# Patient Record
Sex: FEMALE | Race: WHITE | NOT HISPANIC OR LATINO | Employment: FULL TIME | URBAN - METROPOLITAN AREA
[De-identification: names, ages, dates, MRNs, and addresses within clinical notes are randomized per-mention and may not be internally consistent; named-entity substitution may affect disease eponyms.]

---

## 2023-05-01 ENCOUNTER — DOCUMENTATION (OUTPATIENT)
Dept: CARE COORDINATION | Facility: CLINIC | Age: 28
End: 2023-05-01

## 2023-08-08 LAB
CLUE CELLS: NORMAL
NUGENT SCORE: 2
YEAST: NORMAL

## 2023-10-02 ENCOUNTER — OFFICE VISIT (OUTPATIENT)
Dept: OBSTETRICS AND GYNECOLOGY | Facility: CLINIC | Age: 28
End: 2023-10-02

## 2023-10-02 VITALS
WEIGHT: 151 LBS | HEIGHT: 62 IN | DIASTOLIC BLOOD PRESSURE: 74 MMHG | BODY MASS INDEX: 27.79 KG/M2 | SYSTOLIC BLOOD PRESSURE: 112 MMHG

## 2023-10-02 DIAGNOSIS — Z30.431 ENCOUNTER FOR ROUTINE CHECKING OF INTRAUTERINE CONTRACEPTIVE DEVICE (IUD): ICD-10-CM

## 2023-10-02 DIAGNOSIS — N30.90 CYSTITIS: Primary | ICD-10-CM

## 2023-10-02 PROCEDURE — 99212 OFFICE O/P EST SF 10 MIN: CPT | Performed by: OBSTETRICS & GYNECOLOGY

## 2023-10-02 PROCEDURE — 1036F TOBACCO NON-USER: CPT | Performed by: OBSTETRICS & GYNECOLOGY

## 2023-10-02 RX ORDER — IBUPROFEN 600 MG/1
600 TABLET ORAL EVERY 8 HOURS PRN
COMMUNITY
Start: 2023-01-09 | End: 2024-04-19 | Stop reason: ALTCHOICE

## 2023-10-02 RX ORDER — CLOTRIMAZOLE AND BETAMETHASONE DIPROPIONATE 10; .64 MG/G; MG/G
1 CREAM TOPICAL DAILY
COMMUNITY
Start: 2023-09-08 | End: 2023-12-08

## 2023-10-02 RX ORDER — LEVONORGESTREL 19.5 MG/1
INTRAUTERINE DEVICE INTRAUTERINE
COMMUNITY
End: 2024-04-22 | Stop reason: WASHOUT

## 2023-10-02 RX ORDER — LIDOCAINE 50 MG/G
1 PATCH TOPICAL AS NEEDED
COMMUNITY
Start: 2023-04-26 | End: 2024-04-19 | Stop reason: ALTCHOICE

## 2023-10-02 RX ORDER — NITROFURANTOIN 25; 75 MG/1; MG/1
100 CAPSULE ORAL 2 TIMES DAILY
Qty: 14 CAPSULE | Refills: 0 | Status: SHIPPED | OUTPATIENT
Start: 2023-10-02 | End: 2023-10-09

## 2023-10-02 NOTE — PROGRESS NOTES
Subjective   Patient ID: Kiera Vuong is a 27 y.o. female who presents for Urinary Symptom (Possible UTI, c/o frequency, itching, burning, bladder and back pain.).  HPI  Patient with 1 week history of frequency and burning, also with some itching.  Patient with mild back pain, denies fever or chills.  Patient states this feels different than her IC.  No improvement with cranberry pills or probiotics.  Patient is tolerating her IUD well, with monthly menses.        Objective   Physical Exam  Constitutional:       Appearance: Normal appearance. She is well-developed.   Abdominal:      Palpations: Abdomen is soft. There is no mass.      Tenderness: There is no abdominal tenderness. There is no right CVA tenderness or left CVA tenderness.   Neurological:      Mental Status: She is alert.   Psychiatric:         Mood and Affect: Mood normal.         Behavior: Behavior normal.         Assessment/Plan   Problem List Items Addressed This Visit             ICD-10-CM    Encounter for routine checking of intrauterine contraceptive device (IUD) Z30.431     Tolerating Kyleena well.  Patient with monthly menses.         Cystitis - Primary N30.90     Patient with frequency, burning, and itching for 1 week.  Patient with some back pain, denies fever or chills.  Patient with history of IC, and this does feel different.  Patient's been using cranberry pills and probiotics with no improvement.  UA with moderate leukocytes  ASSESSMENT: Probable cystitis  PLAN: We will send urine culture, and treat with Macrobid twice daily for 7 days

## 2023-10-02 NOTE — ASSESSMENT & PLAN NOTE
Patient with frequency, burning, and itching for 1 week.  Patient with some back pain, denies fever or chills.  Patient with history of IC, and this does feel different.  Patient's been using cranberry pills and probiotics with no improvement.  UA with moderate leukocytes  ASSESSMENT: Probable cystitis  PLAN: We will send urine culture, and treat with Macrobid twice daily for 7 days

## 2023-10-04 LAB — BACTERIA UR CULT: NORMAL

## 2023-12-08 DIAGNOSIS — N89.8 VAGINAL DISCHARGE: Primary | ICD-10-CM

## 2023-12-08 RX ORDER — CLOTRIMAZOLE AND BETAMETHASONE DIPROPIONATE 10; .64 MG/G; MG/G
CREAM TOPICAL
Qty: 45 G | Refills: 1 | Status: SHIPPED | OUTPATIENT
Start: 2023-12-08

## 2024-03-22 ENCOUNTER — OFFICE VISIT (OUTPATIENT)
Dept: OBSTETRICS AND GYNECOLOGY | Facility: CLINIC | Age: 29
End: 2024-03-22

## 2024-03-22 VITALS — BODY MASS INDEX: 30.18 KG/M2 | WEIGHT: 165 LBS | DIASTOLIC BLOOD PRESSURE: 80 MMHG | SYSTOLIC BLOOD PRESSURE: 120 MMHG

## 2024-03-22 DIAGNOSIS — T83.32XA IUD STRINGS LOST: Primary | ICD-10-CM

## 2024-03-22 LAB — PREGNANCY TEST URINE, POC: NEGATIVE

## 2024-03-22 PROCEDURE — 99212 OFFICE O/P EST SF 10 MIN: CPT | Performed by: NURSE PRACTITIONER

## 2024-03-22 NOTE — PROGRESS NOTES
Subjective   Patient ID: Kiera Vuong is a 28 y.o. female who presents for Pelvic Pain.  KINGSLEY Qureshi presents today with a couple of concerns.   She is having pelvic pain and bloating.   She has an IUD for pregnancy prevention. Periods are infrequent.   She also c/o mood swings, changes in her skin (acne) and vaginal discharge.   Denies any change in partners. She declines STD testing today.   She denies any fever, chills, od dysuria.     Review of Systems   Genitourinary:  Positive for pelvic pain and vaginal discharge. Negative for urgency.   All other systems reviewed and are negative.      Objective   Physical Exam  Constitutional:       Appearance: Normal appearance.   Pulmonary:      Effort: Pulmonary effort is normal.   Genitourinary:     General: Normal vulva.      Vagina: No vaginal discharge.      Cervix: Normal.      Uterus: Normal.       Adnexa: Right adnexa normal and left adnexa normal.      Comments: No IUD strings noted on exam.   Psychiatric:         Mood and Affect: Mood normal.         Behavior: Behavior normal.         Assessment/Plan   Diagnoses and all orders for this visit:  Here with c/o pelvic pain, bloating. On exam, her IUD strings are lost. No gram stain obtained as there was no discharge noted on exam. No cervical motion tenderness and no fundal tenderness.   IUD strings lost  -     US PELVIS TRANSABDOMINAL WITH TRANSVAGINAL; Future  -     POCT pregnancy, urine manually resulted  Follow-up pending results of pelvic ultrasound.        NATHANAEL Brown-CNP 03/22/24 2:52 PM

## 2024-03-24 ENCOUNTER — PATIENT MESSAGE (OUTPATIENT)
Dept: OBSTETRICS AND GYNECOLOGY | Facility: CLINIC | Age: 29
End: 2024-03-24

## 2024-03-24 DIAGNOSIS — R35.0 URINARY FREQUENCY: Primary | ICD-10-CM

## 2024-03-25 RX ORDER — NITROFURANTOIN 25; 75 MG/1; MG/1
100 CAPSULE ORAL 2 TIMES DAILY
Qty: 10 CAPSULE | Refills: 0 | Status: SHIPPED | OUTPATIENT
Start: 2024-03-25 | End: 2024-03-30

## 2024-03-25 NOTE — PATIENT COMMUNICATION
I spoke with the patient by phone. She has not had any fevers today. She is however having urinary frequency and lower abdominal cramping. Will Rx Macrobid her her. She ha an upcoming pelvic ultrasound scheduled for the 2nd of April.

## 2024-03-28 ENCOUNTER — TELEPHONE (OUTPATIENT)
Dept: OBSTETRICS AND GYNECOLOGY | Facility: CLINIC | Age: 29
End: 2024-03-28

## 2024-03-29 NOTE — TELEPHONE ENCOUNTER
Pt would like to speak with you. She has the disc that her images of her pelvic US  are on. She wants to know if she should drop off the disc for Marland to review.

## 2024-03-29 NOTE — TELEPHONE ENCOUNTER
Spoke to patient- told we are unable to view disc in office. Per Fadia advised to still have pelvic ultrasound done due to symptoms.  ..verbalized understanding

## 2024-03-31 ENCOUNTER — APPOINTMENT (OUTPATIENT)
Dept: CARDIOLOGY | Facility: HOSPITAL | Age: 29
End: 2024-03-31

## 2024-03-31 ENCOUNTER — HOSPITAL ENCOUNTER (EMERGENCY)
Facility: HOSPITAL | Age: 29
Discharge: HOME | End: 2024-03-31
Attending: EMERGENCY MEDICINE

## 2024-03-31 ENCOUNTER — APPOINTMENT (OUTPATIENT)
Dept: RADIOLOGY | Facility: HOSPITAL | Age: 29
End: 2024-03-31

## 2024-03-31 VITALS
HEIGHT: 62 IN | OXYGEN SATURATION: 98 % | RESPIRATION RATE: 18 BRPM | WEIGHT: 160 LBS | BODY MASS INDEX: 29.44 KG/M2 | TEMPERATURE: 98.2 F | HEART RATE: 89 BPM

## 2024-03-31 DIAGNOSIS — R00.2 PALPITATIONS: Primary | ICD-10-CM

## 2024-03-31 LAB
ALBUMIN SERPL BCP-MCNC: 4.4 G/DL (ref 3.4–5)
ALP SERPL-CCNC: 49 U/L (ref 33–110)
ALT SERPL W P-5'-P-CCNC: 8 U/L (ref 7–45)
ANION GAP SERPL CALC-SCNC: 11 MMOL/L (ref 10–20)
AST SERPL W P-5'-P-CCNC: 15 U/L (ref 9–39)
BASOPHILS # BLD AUTO: 0.11 X10*3/UL (ref 0–0.1)
BASOPHILS NFR BLD AUTO: 1.1 %
BILIRUB SERPL-MCNC: 0.4 MG/DL (ref 0–1.2)
BUN SERPL-MCNC: 11 MG/DL (ref 6–23)
CALCIUM SERPL-MCNC: 9.2 MG/DL (ref 8.6–10.3)
CHLORIDE SERPL-SCNC: 104 MMOL/L (ref 98–107)
CO2 SERPL-SCNC: 25 MMOL/L (ref 21–32)
CREAT SERPL-MCNC: 0.74 MG/DL (ref 0.5–1.05)
EGFRCR SERPLBLD CKD-EPI 2021: >90 ML/MIN/1.73M*2
EOSINOPHIL # BLD AUTO: 0.16 X10*3/UL (ref 0–0.7)
EOSINOPHIL NFR BLD AUTO: 1.6 %
ERYTHROCYTE [DISTWIDTH] IN BLOOD BY AUTOMATED COUNT: 11.5 % (ref 11.5–14.5)
GLUCOSE SERPL-MCNC: 82 MG/DL (ref 74–99)
HCG UR QL IA.RAPID: NEGATIVE
HCT VFR BLD AUTO: 42.3 % (ref 36–46)
HGB BLD-MCNC: 15.4 G/DL (ref 12–16)
IMM GRANULOCYTES # BLD AUTO: 0.02 X10*3/UL (ref 0–0.7)
IMM GRANULOCYTES NFR BLD AUTO: 0.2 % (ref 0–0.9)
LYMPHOCYTES # BLD AUTO: 2.82 X10*3/UL (ref 1.2–4.8)
LYMPHOCYTES NFR BLD AUTO: 27.8 %
MAGNESIUM SERPL-MCNC: 1.91 MG/DL (ref 1.6–2.4)
MCH RBC QN AUTO: 32 PG (ref 26–34)
MCHC RBC AUTO-ENTMCNC: 36.4 G/DL (ref 32–36)
MCV RBC AUTO: 88 FL (ref 80–100)
MONOCYTES # BLD AUTO: 0.62 X10*3/UL (ref 0.1–1)
MONOCYTES NFR BLD AUTO: 6.1 %
NEUTROPHILS # BLD AUTO: 6.43 X10*3/UL (ref 1.2–7.7)
NEUTROPHILS NFR BLD AUTO: 63.2 %
NRBC BLD-RTO: 0 /100 WBCS (ref 0–0)
PLATELET # BLD AUTO: 306 X10*3/UL (ref 150–450)
POTASSIUM SERPL-SCNC: 4.2 MMOL/L (ref 3.5–5.3)
PROT SERPL-MCNC: 7.6 G/DL (ref 6.4–8.2)
RBC # BLD AUTO: 4.81 X10*6/UL (ref 4–5.2)
SODIUM SERPL-SCNC: 136 MMOL/L (ref 136–145)
TSH SERPL-ACNC: 1.58 MIU/L (ref 0.44–3.98)
WBC # BLD AUTO: 10.2 X10*3/UL (ref 4.4–11.3)

## 2024-03-31 PROCEDURE — 71045 X-RAY EXAM CHEST 1 VIEW: CPT

## 2024-03-31 PROCEDURE — 99283 EMERGENCY DEPT VISIT LOW MDM: CPT | Mod: 25

## 2024-03-31 PROCEDURE — 83735 ASSAY OF MAGNESIUM: CPT | Performed by: EMERGENCY MEDICINE

## 2024-03-31 PROCEDURE — 81025 URINE PREGNANCY TEST: CPT | Performed by: EMERGENCY MEDICINE

## 2024-03-31 PROCEDURE — 2500000004 HC RX 250 GENERAL PHARMACY W/ HCPCS (ALT 636 FOR OP/ED): Performed by: EMERGENCY MEDICINE

## 2024-03-31 PROCEDURE — 96360 HYDRATION IV INFUSION INIT: CPT

## 2024-03-31 PROCEDURE — 85025 COMPLETE CBC W/AUTO DIFF WBC: CPT | Performed by: EMERGENCY MEDICINE

## 2024-03-31 PROCEDURE — 93005 ELECTROCARDIOGRAM TRACING: CPT

## 2024-03-31 PROCEDURE — 36415 COLL VENOUS BLD VENIPUNCTURE: CPT | Performed by: EMERGENCY MEDICINE

## 2024-03-31 PROCEDURE — 80053 COMPREHEN METABOLIC PANEL: CPT | Performed by: EMERGENCY MEDICINE

## 2024-03-31 PROCEDURE — 84443 ASSAY THYROID STIM HORMONE: CPT | Performed by: EMERGENCY MEDICINE

## 2024-03-31 PROCEDURE — 71045 X-RAY EXAM CHEST 1 VIEW: CPT | Mod: FOREIGN READ | Performed by: RADIOLOGY

## 2024-03-31 RX ADMIN — SODIUM CHLORIDE, POTASSIUM CHLORIDE, SODIUM LACTATE AND CALCIUM CHLORIDE 1000 ML: 600; 310; 30; 20 INJECTION, SOLUTION INTRAVENOUS at 16:00

## 2024-03-31 ASSESSMENT — PAIN SCALES - GENERAL
PAINLEVEL_OUTOF10: 3
PAINLEVEL_OUTOF10: 1

## 2024-03-31 ASSESSMENT — PAIN - FUNCTIONAL ASSESSMENT
PAIN_FUNCTIONAL_ASSESSMENT: 0-10
PAIN_FUNCTIONAL_ASSESSMENT: 0-10

## 2024-03-31 ASSESSMENT — PAIN DESCRIPTION - LOCATION: LOCATION: ABDOMEN

## 2024-03-31 ASSESSMENT — PAIN DESCRIPTION - DESCRIPTORS: DESCRIPTORS: ACHING

## 2024-03-31 NOTE — ED PROVIDER NOTES
"HPI   Chief Complaint   Patient presents with    abdominal pain/ sob       The patient is a 28 year old female presenting with palpitations. Notes that over the last several days she has been experiencing intermittent episodes where she feels like her heart is beating \"harder\" than normal. Denies any sensation of a rapid or slow heartbeat. Denies any dizziness, lightheadedness. Denies any associated nausea, vomiting, diarrhea, abdominal pain. Denies any associated chest pain. Denies any recent medication changes. Denies any lower extremity edema or pain, denies any history of DVT/ PE, denies any oral contraceptive use, denies any prolonged periods of immobility. Denies any personal or family history of early onset cardiac disease.                           Sterling Coma Scale Score: 15                     Patient History   Past Medical History:   Diagnosis Date    Interstitial cystitis (chronic) without hematuria     Interstitial cystitis    Personal history of other diseases of the respiratory system     History of asthma    Personal history of other mental and behavioral disorders     History of anxiety disorder    Personal history of other specified conditions 01/29/2019    History of dysuria    Personal history of urinary (tract) infections 01/29/2019    History of urinary tract infection     Past Surgical History:   Procedure Laterality Date    CYSTOSCOPY W/ URETERAL STENT REMOVAL      KIDNEY STONE SURGERY      OTHER SURGICAL HISTORY  06/02/2022    Bladder surgery    RHINOPLASTY  10/04/2018    Rhinoplasty    SEPTOPLASTY  10/04/2018    Septoplasty     Family History   Problem Relation Name Age of Onset    No Known Problems Mother      Kidney nephrosis Father       Social History     Tobacco Use    Smoking status: Never    Smokeless tobacco: Never   Vaping Use    Vaping Use: Former    Quit date: 3/22/2023    Substances: THC    Devices: LUVHAN   Substance Use Topics    Alcohol use: Yes     Alcohol/week: " 4.0 standard drinks of alcohol     Types: 4 Glasses of wine per week    Drug use: Yes     Types: Marijuana       Physical Exam   ED Triage Vitals [03/31/24 1254]   Temperature Heart Rate Respirations BP   36.8 °C (98.2 °F) 89 18 --      Pulse Ox Temp Source Heart Rate Source Patient Position   98 % Temporal Monitor Sitting      BP Location FiO2 (%)     Left arm --       Physical Exam  Vitals and nursing note reviewed.   Constitutional:       General: She is not in acute distress.     Appearance: She is well-developed.   HENT:      Head: Normocephalic and atraumatic.   Eyes:      Conjunctiva/sclera: Conjunctivae normal.   Cardiovascular:      Rate and Rhythm: Normal rate and regular rhythm.      Pulses:           Radial pulses are 2+ on the right side and 2+ on the left side.        Dorsalis pedis pulses are 2+ on the right side and 2+ on the left side.      Heart sounds: Normal heart sounds, S1 normal and S2 normal. No murmur heard.  Pulmonary:      Effort: Pulmonary effort is normal. No respiratory distress.      Breath sounds: Normal breath sounds. No decreased breath sounds, wheezing, rhonchi or rales.   Abdominal:      General: Abdomen is flat.      Palpations: Abdomen is soft.      Tenderness: There is no abdominal tenderness.   Musculoskeletal:         General: No swelling.      Cervical back: Neck supple.      Right lower leg: No edema.      Left lower leg: No edema.   Skin:     General: Skin is warm and dry.      Capillary Refill: Capillary refill takes less than 2 seconds.   Neurological:      General: No focal deficit present.      Mental Status: She is alert.      Cranial Nerves: Cranial nerves 2-12 are intact.      Sensory: Sensation is intact.      Motor: Motor function is intact.   Psychiatric:         Mood and Affect: Mood normal.         ED Course & MDM       Medical Decision Making  Patient presenting with palpitations. On exam patient very well appearing, demonstrating normal range vital signs,  reassuring cardiopulmonary/ vascular/ neurological examination. Will assess for dysrhythmia, precipitating metabolic abnormality, anemia,  pericarditis/ myocarditis, pneumonia, pneumothorax with troponins, ECG, CXR, labs, urine pregnancy testing. Will administer IV fluids with consideration for orthostasis/ dehydration. Disposition pending labs, imaging, reassessment      ED Course as of 04/03/24 1732   Sun Mar 31, 2024   1725 ECG: Sinus rhythm, rate 66, QRS 94, QTc 391.  No ST changes to suggest ischemia, no evidence of preexcitation syndrome. [BR]   1855 Patient's TSH within normal limits, CMP unremarkable, magnesium within normal limits, hCG negative.  Chest x-ray unremarkable.  Patient continues to demonstrate reassuring vital signs in the emergency department, she is appropriate for discharge.  Will provide cardiology follow-up for recurrent palpitations.  Discussed return precautions and follow-up with the patient and family, they convey understanding to these instructions.  Patient discharged in good condition [BR]      ED Course User Index  [BR] Partha Casiano MD         Diagnoses as of 04/03/24 1732   Palpitations         Procedure  Procedures     Partha Casiano MD  04/03/24 1732

## 2024-04-02 ENCOUNTER — HOSPITAL ENCOUNTER (OUTPATIENT)
Dept: RADIOLOGY | Facility: HOSPITAL | Age: 29
Discharge: HOME | End: 2024-04-02

## 2024-04-02 DIAGNOSIS — T83.32XA IUD STRINGS LOST: ICD-10-CM

## 2024-04-02 PROCEDURE — 76856 US EXAM PELVIC COMPLETE: CPT

## 2024-04-02 PROCEDURE — 76830 TRANSVAGINAL US NON-OB: CPT | Performed by: RADIOLOGY

## 2024-04-02 PROCEDURE — 76856 US EXAM PELVIC COMPLETE: CPT | Performed by: RADIOLOGY

## 2024-04-03 ENCOUNTER — APPOINTMENT (OUTPATIENT)
Dept: CARDIOLOGY | Facility: CLINIC | Age: 29
End: 2024-04-03

## 2024-04-03 PROBLEM — R00.2 PALPITATIONS: Status: ACTIVE | Noted: 2024-04-03

## 2024-04-03 PROBLEM — R06.02 SHORTNESS OF BREATH: Status: ACTIVE | Noted: 2024-04-03

## 2024-04-03 NOTE — PROGRESS NOTES
[facility] Cardiology    Patient Name: Kiera Vuong  Patient : 1995    Scribe Attestation  By signing my name below, ICarrie Scribe   attest that this documentation has been prepared under the direction and in the presence of Partha Meneses DO.        Reason for visit:  This is a 28-year-old female here for evaluation regarding palpitations and shortness of breath.     HPI:  This is a 28-year-old female here for evaluation regarding palpitations and shortness of breath.  The patient has never been evaluated by [facility] in the past.  Patient was seen in the emergency department on 3/31/2024 for palpitations, abdominal pain, and shortness of breath.  CMP done 3/31/2024 showed normal serum sodium and potassium with a serum creatinine 0.74, normal ALT/AST, serum magnesium was 1.91, TSH was 1.58, hemoglobin was 15.4.  Chest x-ray done 3/31/2024 showed no radiographic evidence of acute cardiopulmonary disease.  ECG done 3/31/2024 showed sinus rhythm, PAC, heart rate 66 bpm.  The patient was subsequently discharged home from the emergency department and referred to cardiology outpatient. ECG done today showed sinus rhythm with a heart rate of 87 bpm.  The patient reports random intermittent palpitations that occurs a few times per month. She also she noticed that palpitations sometimes are related to pain. She also reports shortness of breath on exertion after climbing a flight of stairs and lightheadedness when quickly changing positions and when taking hot showers. She denies associated chest pain on exertion. During my exam, she was resting comfortably on the exam table.          Assessment/Plan:   Palpitations  The patient reports palpitations as described in the HPI. She was seen in the emergency department on 3/31/2024 for palpitations, abdominal pain, and shortness of breath.    ECG done today showed sinus rhythm with a heart rate of 87 bpm.     Blood pressure appears controlled on exam today.  Recent lab works as noted in the HPI.  Echocardiogram and Holter monitor were ordered as noted below.  Patient should follow general recommendations including avoiding excessive alcohol intake, avoiding excessive caffeine intake, staying well-hydrated, getting an appropriate amount of sleep.  Follow up in 1 month and sooner if necessary.     2. Shortness of breath  The patient was seen at the ED on 3/31/24 for shortness of breath and palpitations.  She does not appear significantly volume overloaded on exam today.  Recent lab works as noted in the HPI.  Echocardiogram and TMET ordered as noted below.  I discussed with her the importance of following a low-sodium heart healthy diet as well as weight loss.   Follow up in 1 month and sooner if necessary.     3. Lightheadedness/dizziness  The patient reports occasional lightheadedness when quickly changing positions.  ECG done today showed sinus rhythm with a heart rate of 87 bpm.    Blood pressure appears controlled on exam today.  Recent lab works as noted in the HPI.  Patient should follow general recommendations including staying well-hydrated, changing the positions slowly, wearing compression stockings as necessary, and routine exercise.     Orders:   Holter monitor,   Echocardiogram,   TMET  Follow-up in 1 month.      Lifestyle Recommendations  I recommend a whole-food plant-based diet, an eating pattern that encourages the consumption of unrefined plant foods (such as fruits, vegetables, tubers, whole grains, legumes, nuts and seeds) and discourages meats, dairy products, eggs and processed foods.     The AHA/ACC recommends that the patient consume a dietary pattern that emphasizes intake of vegetables, fruits, and whole grains; includes low-fat dairy products, poultry, fish, legumes, non-tropical vegetable oils, and nuts; and limits intake of sodium, sweets, sugar-sweetened beverages, and red meats.  Adapt  this dietary pattern to appropriate calorie requirements (a 500-750 kcal/day deficit to loose weight), personal and cultural food preferences, and nutrition therapy for other medical conditions (including diabetes).  Achieve this pattern by following plans such as the [place] Mediterranean, DASH dietary pattern, or AHA diet.     Engage in 2 hours and 30 minutes per week of moderate-intensity physical activity, or 1 hour and 15 minutes (75 minutes) per week of vigorous-intensity aerobic physical activity, or an equivalent combination of moderate and vigorous-intensity aerobic physical activity. Aerobic activity should be performed in episodes of at least 10 minutes preferably spread throughout the week.     Adhering to a heart healthy diet, regular exercise habits, avoidance of tobacco products, and maintenance of a healthy weight are crucial components of their heart disease risk reduction.     Any positive review of systems not specifically addressed in the office visit today should be evaluated and treated by the patients primary care physician or in an emergency department if necessary     Patient was notified that results from ordered tests will be called to the patient if it changes current management; it will otherwise be discussed at a future appointment and available on [facility].     Thank you for allowing me to participate in the care of this patient.        This document was generated using the assistance of voice recognition software. If there are any errors of spelling, grammar, syntax, or meaning; please feel free to contact me directly for clarification.      Past Medical History:  She has a past medical history of Interstitial cystitis (chronic) without hematuria, Personal history of other diseases of the respiratory system, Personal history of other mental and behavioral disorders, Personal history of other specified conditions (01/29/2019), and Personal history of urinary (tract) infections  "(01/29/2019).    Past Surgical History:  She has a past surgical history that includes Septoplasty (10/04/2018); Rhinoplasty (10/04/2018); Other surgical history (06/02/2022); Kidney stone surgery; and Cystoscopy w/ ureteral stent removal.      Social History:  She reports that she has never smoked. She has never used smokeless tobacco. She reports current alcohol use of about 4.0 standard drinks of alcohol per week. She reports current drug use. Drug: Marijuana.    Family History:  Family History   Problem Relation Name Age of Onset    No Known Problems Mother      Kidney nephrosis Father          Allergies:  Bactrim [sulfamethoxazole-trimethoprim] and Fluconazole    Outpatient Medications:  Current Outpatient Medications   Medication Instructions    clotrimazole-betamethasone (Lotrisone) cream APPLY SPARINGLY TO AFFECTED AREA 3 TIMES A DAY    ibuprofen 600 mg, oral, Every 8 hours PRN    levonorgestreL (Kyleena) 17.5 mcg/24 hrs (5 yrs) 19.5 mg intrauterine device intrauterine, Inserted on 09/04/2020    lidocaine (Lidoderm) 5 % patch 1 patch, transdermal, As needed        ROS:  A 14 point review of systems was done and is negative other than as stated in HPI    Vitals:      1/10/2023     9:45 AM 1/10/2023     9:46 AM 4/26/2023     5:38 PM 8/7/2023     9:09 AM 10/2/2023     1:04 PM 3/22/2024     1:19 PM 3/31/2024    12:54 PM   Vitals   Systolic  132 125 102 112 120    Diastolic  82 90 70 74 80    Heart Rate  103 84    89   Temp   36.6 °C (97.8 °F)    36.8 °C (98.2 °F)   Resp   16    18   Height (in) 1.575 m (5' 2\")   1.575 m (5' 2\") 1.575 m (5' 2\")  1.575 m (5' 2\")   Weight (lb) 143   153.66 151 165 160   BMI 26.16 kg/m2   28.1 kg/m2 27.62 kg/m2 30.18 kg/m2 29.26 kg/m2   BSA (m2) 1.68 m2   1.75 m2 1.73 m2 1.81 m2 1.78 m2        Physical Exam:   Constitutional: Cooperative, in no acute distress, alert, appears stated age.  Skin: Skin color, texture, turgor normal. No rashes or lesions.  Head: Normocephalic. No masses, " lesions, tenderness or abnormalities  Eyes: Extraocular movements are grossly intact.  Mouth and throat: Mucous membranes moist  Neck: Neck supple, no carotid bruits, no JVD  Respiratory: Lungs clear to auscultation, no wheezing or rhonchi, no use of accessory muscles  Chest wall: No scars, normal excursion with respiration  Cardiovascular: Regular rhythm without murmur  Gastrointestinal: Abdomen soft, nontender. Bowel sounds normal.  Musculoskeletal: Strength equal in upper extremities  Extremities: No pitting edema  Neurologic: Sensation grossly intact, alert and oriented ×3    Intake/Output:   No intake/output data recorded.    Outpatient Medications  Current Outpatient Medications on File Prior to Visit   Medication Sig Dispense Refill    clotrimazole-betamethasone (Lotrisone) cream APPLY SPARINGLY TO AFFECTED AREA 3 TIMES A DAY 45 g 1    ibuprofen 600 mg tablet Take 1 tablet (600 mg) by mouth every 8 hours if needed.      levonorgestreL (Kyleena) 17.5 mcg/24 hrs (5 yrs) 19.5 mg intrauterine device by intrauterine route. Inserted on 2020      lidocaine (Lidoderm) 5 % patch Place 1 patch on the skin if needed.      [] nitrofurantoin, macrocrystal-monohydrate, (Macrobid) 100 mg capsule Take 1 capsule (100 mg) by mouth 2 times a day for 5 days. 10 capsule 0     No current facility-administered medications on file prior to visit.       Labs: (past 26 weeks)  Recent Results (from the past 4368 hour(s))   POCT pregnancy, urine manually resulted    Collection Time: 24  1:57 PM   Result Value Ref Range    Preg Test, Ur Negative Negative   CBC and Auto Differential    Collection Time: 24  3:56 PM   Result Value Ref Range    WBC 10.2 4.4 - 11.3 x10*3/uL    nRBC 0.0 0.0 - 0.0 /100 WBCs    RBC 4.81 4.00 - 5.20 x10*6/uL    Hemoglobin 15.4 12.0 - 16.0 g/dL    Hematocrit 42.3 36.0 - 46.0 %    MCV 88 80 - 100 fL    MCH 32.0 26.0 - 34.0 pg    MCHC 36.4 (H) 32.0 - 36.0 g/dL    RDW 11.5 11.5 - 14.5 %     Platelets 306 150 - 450 x10*3/uL    Neutrophils % 63.2 40.0 - 80.0 %    Immature Granulocytes %, Automated 0.2 0.0 - 0.9 %    Lymphocytes % 27.8 13.0 - 44.0 %    Monocytes % 6.1 2.0 - 10.0 %    Eosinophils % 1.6 0.0 - 6.0 %    Basophils % 1.1 0.0 - 2.0 %    Neutrophils Absolute 6.43 1.20 - 7.70 x10*3/uL    Immature Granulocytes Absolute, Automated 0.02 0.00 - 0.70 x10*3/uL    Lymphocytes Absolute 2.82 1.20 - 4.80 x10*3/uL    Monocytes Absolute 0.62 0.10 - 1.00 x10*3/uL    Eosinophils Absolute 0.16 0.00 - 0.70 x10*3/uL    Basophils Absolute 0.11 (H) 0.00 - 0.10 x10*3/uL   Magnesium    Collection Time: 03/31/24  3:56 PM   Result Value Ref Range    Magnesium 1.91 1.60 - 2.40 mg/dL   Comprehensive metabolic panel    Collection Time: 03/31/24  3:56 PM   Result Value Ref Range    Glucose 82 74 - 99 mg/dL    Sodium 136 136 - 145 mmol/L    Potassium 4.2 3.5 - 5.3 mmol/L    Chloride 104 98 - 107 mmol/L    Bicarbonate 25 21 - 32 mmol/L    Anion Gap 11 10 - 20 mmol/L    Urea Nitrogen 11 6 - 23 mg/dL    Creatinine 0.74 0.50 - 1.05 mg/dL    eGFR >90 >60 mL/min/1.73m*2    Calcium 9.2 8.6 - 10.3 mg/dL    Albumin 4.4 3.4 - 5.0 g/dL    Alkaline Phosphatase 49 33 - 110 U/L    Total Protein 7.6 6.4 - 8.2 g/dL    AST 15 9 - 39 U/L    Bilirubin, Total 0.4 0.0 - 1.2 mg/dL    ALT 8 7 - 45 U/L   hCG, Urine, Qualitative    Collection Time: 03/31/24  3:56 PM   Result Value Ref Range    HCG, Urine NEGATIVE NEGATIVE   TSH with reflex to Free T4 if abnormal    Collection Time: 03/31/24  3:56 PM   Result Value Ref Range    Thyroid Stimulating Hormone 1.58 0.44 - 3.98 mIU/L   ECG 12 lead    Collection Time: 03/31/24  3:59 PM   Result Value Ref Range    Ventricular Rate 66 BPM    Atrial Rate 66 BPM    NM Interval 126 ms    QRS Duration 94 ms    QT Interval 373 ms    QTC Calculation(Bazett) 391 ms    P Axis 2 degrees    R Axis 31 degrees    T Axis 27 degrees    QRS Count 11 beats    Q Onset 249 ms    T Offset 436 ms    QTC Fredericia 385 ms          CV Studies:  EKG:  Encounter Date: 03/31/24   ECG 12 lead   Result Value    Ventricular Rate 66    Atrial Rate 66    VA Interval 126    QRS Duration 94    QT Interval 373    QTC Calculation(Bazett) 391    P Axis 2    R Axis 31    T Axis 27    QRS Count 11    Q Onset 249    T Offset 436    QTC Fredericia 385    Narrative    Sinus rhythm  Atrial premature complex  RSR' in V1 or V2, right VCD or RVH     Echocardiogram: No results found for this or any previous visit from the past 1825 days.    Stress Testing IMGRESULT(XBP2786:1:1825): No results found for this or any previous visit from the past 1825 days.    Cardiac Catheterization: No results found for this or any previous visit from the past 1825 days.  No results found for this or any previous visit from the past 3650 days.     Cardiac Scoring: No results found for this or any previous visit from the past 1825 days.    AAA : No results found for this or any previous visit from the past 1825 days.    OTHER: No results found for this or any previous visit from the past 1825 days.    LAST IMAGING RESULTS  ECG 12 lead  Sinus rhythm  Atrial premature complex  RSR' in V1 or V2, right VCD or RVH      Problem List Items Addressed This Visit       Palpitations - Primary    Relevant Orders    ECG 12 Lead (Completed)    Holter Or Event Cardiac Monitor    Transthoracic Echo (TTE) Complete    Follow Up In Cardiology    Stress Test    Shortness of breath    Relevant Orders    ECG 12 Lead (Completed)    Holter Or Event Cardiac Monitor    Transthoracic Echo (TTE) Complete    Follow Up In Cardiology    Stress Test     Other Visit Diagnoses       Dizziness        Relevant Orders    Holter Or Event Cardiac Monitor    Transthoracic Echo (TTE) Complete    Follow Up In Cardiology    Stress Test                   Partha Meneses DO, FACC, FACOI

## 2024-04-05 LAB
ATRIAL RATE: 66 BPM
P AXIS: 2 DEGREES
PR INTERVAL: 126 MS
Q ONSET: 249 MS
QRS COUNT: 11 BEATS
QRS DURATION: 94 MS
QT INTERVAL: 373 MS
QTC CALCULATION(BAZETT): 391 MS
QTC FREDERICIA: 385 MS
R AXIS: 31 DEGREES
T AXIS: 27 DEGREES
T OFFSET: 436 MS
VENTRICULAR RATE: 66 BPM

## 2024-04-08 ENCOUNTER — OFFICE VISIT (OUTPATIENT)
Dept: CARDIOLOGY | Facility: CLINIC | Age: 29
End: 2024-04-08

## 2024-04-08 ENCOUNTER — DOCUMENTATION (OUTPATIENT)
Dept: CARDIOLOGY | Facility: CLINIC | Age: 29
End: 2024-04-08

## 2024-04-08 VITALS
HEART RATE: 87 BPM | SYSTOLIC BLOOD PRESSURE: 130 MMHG | DIASTOLIC BLOOD PRESSURE: 98 MMHG | BODY MASS INDEX: 30 KG/M2 | HEIGHT: 62 IN | WEIGHT: 163 LBS

## 2024-04-08 DIAGNOSIS — R06.02 SHORTNESS OF BREATH: ICD-10-CM

## 2024-04-08 DIAGNOSIS — R00.2 PALPITATIONS: Primary | ICD-10-CM

## 2024-04-08 DIAGNOSIS — R42 DIZZINESS: ICD-10-CM

## 2024-04-08 PROCEDURE — 99204 OFFICE O/P NEW MOD 45 MIN: CPT | Performed by: INTERNAL MEDICINE

## 2024-04-08 PROCEDURE — 1036F TOBACCO NON-USER: CPT | Performed by: INTERNAL MEDICINE

## 2024-04-08 PROCEDURE — 93000 ELECTROCARDIOGRAM COMPLETE: CPT | Performed by: INTERNAL MEDICINE

## 2024-04-08 NOTE — PROGRESS NOTES
Patient here to establish care with Dr. Meneses. 14 day monitor ordered. Monitor applied to left upper chest area using application techniques per [facility]. Patient was then thoroughly educated on how to use device and will return through [employer] in 14 days. Patient shows understanding. Patient was added to holter monitor schedule per protocol and documented in [facility] log book. Registration sheet handed to EP.        MONITOR ID # : [id number]

## 2024-04-09 ENCOUNTER — ANCILLARY PROCEDURE (OUTPATIENT)
Dept: CARDIOLOGY | Facility: CLINIC | Age: 29
End: 2024-04-09

## 2024-04-09 DIAGNOSIS — R06.02 SHORTNESS OF BREATH: ICD-10-CM

## 2024-04-09 DIAGNOSIS — R00.2 PALPITATIONS: ICD-10-CM

## 2024-04-09 DIAGNOSIS — R42 DIZZINESS: ICD-10-CM

## 2024-04-09 PROCEDURE — 93248 EXT ECG>7D<15D REV&INTERPJ: CPT | Performed by: INTERNAL MEDICINE

## 2024-04-09 PROCEDURE — 93246 EXT ECG>7D<15D RECORDING: CPT | Performed by: INTERNAL MEDICINE

## 2024-04-18 ENCOUNTER — TELEPHONE (OUTPATIENT)
Dept: OBSTETRICS AND GYNECOLOGY | Facility: CLINIC | Age: 29
End: 2024-04-18

## 2024-04-18 DIAGNOSIS — F41.9 ANXIETY DUE TO INVASIVE PROCEDURE: Primary | ICD-10-CM

## 2024-04-18 RX ORDER — LORAZEPAM 0.5 MG/1
0.5 TABLET ORAL ONCE
Qty: 1 TABLET | Refills: 0 | Status: SHIPPED | OUTPATIENT
Start: 2024-04-18 | End: 2024-04-19

## 2024-04-18 NOTE — TELEPHONE ENCOUNTER
Patient is scheduled for IUD removal tomorrow. She plans on taking Ibuprofen and Tylenol prior. Also plans to have her fiance drive her. Requesting a medication to help relax her.    [facility] [place]

## 2024-04-19 ENCOUNTER — PROCEDURE VISIT (OUTPATIENT)
Dept: OBSTETRICS AND GYNECOLOGY | Facility: CLINIC | Age: 29
End: 2024-04-19

## 2024-04-19 VITALS — DIASTOLIC BLOOD PRESSURE: 70 MMHG | SYSTOLIC BLOOD PRESSURE: 110 MMHG

## 2024-04-19 DIAGNOSIS — Z30.011 ENCOUNTER FOR INITIAL PRESCRIPTION OF CONTRACEPTIVE PILLS: ICD-10-CM

## 2024-04-19 DIAGNOSIS — Z30.432 ENCOUNTER FOR IUD REMOVAL: Primary | ICD-10-CM

## 2024-04-19 PROCEDURE — 58301 REMOVE INTRAUTERINE DEVICE: CPT | Performed by: NURSE PRACTITIONER

## 2024-04-19 RX ORDER — DROSPIRENONE 4 MG/1
4 TABLET, FILM COATED ORAL DAILY
Qty: 28 TABLET | Refills: 11 | Status: SHIPPED | OUTPATIENT
Start: 2024-04-19 | End: 2024-04-22 | Stop reason: WASHOUT

## 2024-04-20 PROCEDURE — 58301 REMOVE INTRAUTERINE DEVICE: CPT | Performed by: NURSE PRACTITIONER

## 2024-04-20 NOTE — PROGRESS NOTES
Subjective   Patient ID: Kiera uVong is a 28 y.o. female who presents for Removal of an intrauterine device (-Aracelisance is in the room with the patient-/Reviewing  EMR/Negative Pap 2022/Kyleena inserted 09/04/2020).  HPI  Here for IUD removal. She would like her IUD removed d/t pelvic pain.  She would like to start an ocp; something that can help with her acne. She has been struggling with anxiety lately. She would like to monitor this moving forward. She has a psychiatrist that she will reach out to.    Review of Systems   All other systems reviewed and are negative.      Objective   Physical Exam  Constitutional:       Appearance: Normal appearance.   Pulmonary:      Effort: Pulmonary effort is normal.   Genitourinary:     General: Normal vulva.      Cervix: Normal.      Uterus: Normal.       Adnexa: Right adnexa normal and left adnexa normal.   Psychiatric:         Mood and Affect: Mood normal.         Behavior: Behavior normal.     Patient ID: Kiera Vuong is a 28 y.o. female.    IUD Removal    Date/Time: 4/20/2024 11:45 AM    Performed by: THOMAS Brown  Authorized by: THOMAS Brown    Consent:     Consent obtained:  Written    Consent given by:  Patient    Procedure risks and benefits discussed: yes      Patient questions answered: yes      Patient agrees, verbalizes understanding, and wants to proceed: yes      Educational handouts given: yes      Instructions and paperwork completed: yes    Universal protocol:     Patient states understanding of procedure being performed: yes      Relevant documents present and verified: yes      Imaging studies available: yes      Required blood products, implants, devices, and special equipment available: yes    Procedure:     Removed with no complications: yes      Removal due to infection and inflammatory reaction: yes        Assessment/Plan   Diagnoses and all orders for this visit:  Encounter for IUD removal  Here for IUD removal. No  strings visualized. IUD removed blindly.    Encounter for initial prescription of contraceptive pills  -     drospirenone, contraceptive, (Slynd) 4 mg (28) tablet; Take 1 tablet by mouth once daily.  Other orders  -     IUD Removal  Follow-up in 2-3 months to reevaluate symptoms and for med check; sooner if needed.        Suir Hardin, NATHANAEL-CNP 04/20/24 11:41 AM

## 2024-04-22 ENCOUNTER — TELEPHONE (OUTPATIENT)
Dept: OBSTETRICS AND GYNECOLOGY | Facility: CLINIC | Age: 29
End: 2024-04-22

## 2024-04-22 DIAGNOSIS — Z30.41 ENCOUNTER FOR SURVEILLANCE OF CONTRACEPTIVE PILLS: Primary | ICD-10-CM

## 2024-04-22 RX ORDER — NORETHINDRONE ACETATE AND ETHINYL ESTRADIOL 1MG-20(21)
1 KIT ORAL DAILY
Qty: 28 TABLET | Refills: 11 | Status: SHIPPED | OUTPATIENT
Start: 2024-04-22 | End: 2025-04-22

## 2024-04-22 NOTE — TELEPHONE ENCOUNTER
Zeinab says jaylyn will be $200 a month for her could you change it to Junel she has had it in the past or if there is something else you want to switch her to that is similar to the slind. She uses [facility] in [place]

## 2024-04-25 ENCOUNTER — HOSPITAL ENCOUNTER (OUTPATIENT)
Dept: CARDIOLOGY | Facility: HOSPITAL | Age: 29
Discharge: HOME | End: 2024-04-25

## 2024-04-25 DIAGNOSIS — R06.02 SHORTNESS OF BREATH: ICD-10-CM

## 2024-04-25 DIAGNOSIS — R42 DIZZINESS: ICD-10-CM

## 2024-04-25 DIAGNOSIS — R00.2 PALPITATIONS: ICD-10-CM

## 2024-04-25 LAB
AORTIC VALVE MEAN GRADIENT: 3 MMHG
AORTIC VALVE PEAK VELOCITY: 1.2 M/S
AV PEAK GRADIENT: 5.7 MMHG
AVA (PEAK VEL): 2.58 CM2
AVA (VTI): 2.58 CM2
EJECTION FRACTION APICAL 4 CHAMBER: 72.2
LEFT ATRIUM VOLUME AREA LENGTH INDEX BSA: 13.6 ML/M2
LEFT VENTRICLE INTERNAL DIMENSION DIASTOLE: 4.19 CM (ref 3.5–6)
LEFT VENTRICULAR OUTFLOW TRACT DIAMETER: 1.91 CM
LV EJECTION FRACTION BIPLANE: 70 %
MITRAL VALVE E/A RATIO: 1.22
MITRAL VALVE E/E' RATIO: 7.12
RIGHT VENTRICLE FREE WALL PEAK S': 8.65 CM/S
TRICUSPID ANNULAR PLANE SYSTOLIC EXCURSION: 2.1 CM

## 2024-04-25 PROCEDURE — 93017 CV STRESS TEST TRACING ONLY: CPT

## 2024-04-25 PROCEDURE — 93018 CV STRESS TEST I&R ONLY: CPT | Performed by: INTERNAL MEDICINE

## 2024-04-25 PROCEDURE — 93306 TTE W/DOPPLER COMPLETE: CPT | Performed by: INTERNAL MEDICINE

## 2024-04-25 PROCEDURE — 93016 CV STRESS TEST SUPVJ ONLY: CPT | Performed by: INTERNAL MEDICINE

## 2024-04-25 PROCEDURE — 93306 TTE W/DOPPLER COMPLETE: CPT

## 2024-04-26 ENCOUNTER — TELEPHONE (OUTPATIENT)
Dept: CARDIOLOGY | Facility: CLINIC | Age: 29
End: 2024-04-26

## 2024-04-26 NOTE — TELEPHONE ENCOUNTER
4/26/24  1023  Called results to patient with patient verbalizing understanding.    ----- Message from Partha Meneses DO sent at 4/25/2024  5:06 PM EDT -----  Normal left ventricular systolic function with an ejection fraction of 65%, normal right ventricular systolic function, no significant valve abnormalities.

## 2024-04-26 NOTE — TELEPHONE ENCOUNTER
4/26/24  1024  Called results to patient with patient verbalizing understanding.    ----- Message from Partha Meneses DO sent at 4/25/2024  3:16 PM EDT -----  Negative for ischemia at 10.1 METS activity.

## 2024-05-02 ENCOUNTER — APPOINTMENT (OUTPATIENT)
Dept: OBSTETRICS AND GYNECOLOGY | Facility: CLINIC | Age: 29
End: 2024-05-02

## 2024-05-20 PROBLEM — R42 DIZZINESS: Status: ACTIVE | Noted: 2024-05-20

## 2024-05-20 PROBLEM — R42 LIGHTHEADEDNESS: Status: ACTIVE | Noted: 2024-05-20

## 2024-05-21 DIAGNOSIS — N89.8 OTHER SPECIFIED NONINFLAMMATORY DISORDERS OF VAGINA: ICD-10-CM

## 2024-05-21 RX ORDER — FLUCONAZOLE 150 MG/1
TABLET ORAL
Qty: 2 TABLET | Refills: 2 | Status: SHIPPED | OUTPATIENT
Start: 2024-05-21

## 2024-05-21 NOTE — PROGRESS NOTES
[facility] and Vascular Cardiology    Patient Name: Kiera Vuong  Patient : 1995      Scribe Attestation  By signing my name below, ICarrie Scribe   attest that this documentation has been prepared under the direction and in the presence of Partha Meneses DO.      Reason for visit:  This is a 28-year-old female here for follow-up regarding palpitations, shortness of breath, lightheadedness/dizziness.     HPI:  This is a 28-year-old female here for follow-up regarding palpitations, shortness of breath, lightheadedness/dizziness.  The patient was last evaluated by me in 2024.  At that visit I ordered an echocardiogram, treadmill exercise stress test, Holter monitor, and asked the patient to follow-up in 1 month and sooner if necessary.  Holter monitor done in 2024 showed underlying sinus rhythm with an average heart rate of 76 bpm, rare SVE/VE, 43 patient triggered events correlating with sinus rhythm and SVE/VE.  Treadmill exercise stress test done 2024 was negative for ischemia at 10.1 METS activity.  Echocardiogram done 2024 showed normal left ventricular systolic function with an ejection fraction of 65%, normal right ventricular systolic function, and no significant valve abnormalities. ECG done today showed sinus rhythm with a heart rate of 86 bpm.  The patient reports that her palpitations, shortness of breath and lightheadedness had improved. She states that she believes her anxiety and her IUD contributed to her symptoms. She states that palpitations, shortness of breath and dizziness significantly improved after she was switched to another contraception. She also states that she was seen by her psychiatrist for anxiety. During my exam, she was resting comfortably on the exam table.            Assessment/Plan:   Palpitations  The previously reported palpitations had improved.  Holter monitor done in 2024 showed underlying sinus rhythm  with an average heart rate of 76 bpm, rare SVE/VE, 43 patient triggered events correlating with sinus rhythm and SVE/VE.    ECG done today showed sinus rhythm with a heart rate of 86 bpm.    Echocardiogram done 4/25/2024 showed normal left ventricular systolic function with an ejection fraction of 65%, normal right ventricular systolic function, and no significant valve abnormalities.   I discussed starting her on beta blocker which she declined at this time. She states that her psychiatrist had prescribed methylphenidate and would like to observe first if her palpitations will improve.  Patient should follow general recommendations including avoiding excessive alcohol intake, avoiding excessive caffeine intake, staying well-hydrated, getting an appropriate amount of sleep.  Follow up in 1 year and sooner if necessary.       2. Shortness of breath  The previously reported shortness of breath had improved.  Echocardiogram done 4/25/2024 showed normal left ventricular systolic function with an ejection fraction of 65%, normal right ventricular systolic function, and no significant valve abnormalities.   She does not appear significantly volume overloaded on exam today.  Treadmill exercise stress test done 4/25/2024 was negative for ischemia at 10.1 METS activity.  I discussed with her the importance of following a low-sodium heart healthy diet as well as weight loss.   Follow up in 1 year and sooner if necessary.      3. Lightheadedness/dizziness  The previously reported occasional lightheadedness when quickly changing positions had improved.  ECG done today showed sinus rhythm with a heart rate of 86 bpm.     Blood pressure appears controlled on exam today.  Echocardiogram done 4/25/2024 showed normal left ventricular systolic function with an ejection fraction of 65%, normal right ventricular systolic function, and no significant valve abnormalities.   Patient should follow general recommendations including staying  well-hydrated, changing the positions slowly, wearing compression stockings as necessary, and routine exercise.        Orders:   Discussed beta blocker -patient declined  Follow-up in 1 year.    Lifestyle Recommendations  I recommend a whole-food plant-based diet, an eating pattern that encourages the consumption of unrefined plant foods (such as fruits, vegetables, tubers, whole grains, legumes, nuts and seeds) and discourages meats, dairy products, eggs and processed foods.     The AHA/ACC recommends that the patient consume a dietary pattern that emphasizes intake of vegetables, fruits, and whole grains; includes low-fat dairy products, poultry, fish, legumes, non-tropical vegetable oils, and nuts; and limits intake of sodium, sweets, sugar-sweetened beverages, and red meats.  Adapt this dietary pattern to appropriate calorie requirements (a 500-750 kcal/day deficit to loose weight), personal and cultural food preferences, and nutrition therapy for other medical conditions (including diabetes).  Achieve this pattern by following plans such as the [place] Mediterranean, DASH dietary pattern, or AHA diet.     Engage in 2 hours and 30 minutes per week of moderate-intensity physical activity, or 1 hour and 15 minutes (75 minutes) per week of vigorous-intensity aerobic physical activity, or an equivalent combination of moderate and vigorous-intensity aerobic physical activity. Aerobic activity should be performed in episodes of at least 10 minutes preferably spread throughout the week.     Adhering to a heart healthy diet, regular exercise habits, avoidance of tobacco products, and maintenance of a healthy weight are crucial components of their heart disease risk reduction.     Any positive review of systems not specifically addressed in the office visit today should be evaluated and treated by the patients primary care physician or in an emergency department if necessary     Patient was notified that results from  ordered tests will be called to the patient if it changes current management; it will otherwise be discussed at a future appointment and available on [facility].     Thank you for allowing me to participate in the care of this patient.        This document was generated using the assistance of voice recognition software. If there are any errors of spelling, grammar, syntax, or meaning; please feel free to contact me directly for clarification.    Past Medical History:  She has a past medical history of Depression, Interstitial cystitis (chronic) without hematuria, Kidney stone, Personal history of other diseases of the respiratory system, Personal history of other mental and behavioral disorders, Personal history of other specified conditions (01/29/2019), Personal history of urinary (tract) infections (01/29/2019), and Urinary tract infection.    Past Surgical History:  She has a past surgical history that includes Septoplasty (10/04/2018); Rhinoplasty (10/04/2018); Other surgical history (06/02/2022); Kidney stone surgery; Cystoscopy w/ ureteral stent removal; and Exploratory laparotomy.      Social History:  She reports that she has never smoked. She has never used smokeless tobacco. She reports current alcohol use of about 4.0 standard drinks of alcohol per week. She reports that she does not currently use drugs after having used the following drugs: Marijuana.    Family History:  Family History   Problem Relation Name Age of Onset    Asthma Mother Mom     Kidney nephrosis Father Dad     Depression Father Dad     Heart disease Maternal Grandfather Maternal Grandfather     Kidney disease Maternal Grandfather Maternal Grandfather     Stroke Maternal Grandfather Maternal Grandfather     Diabetes Paternal Grandfather Maternal Grandfather     Arthritis Paternal Grandmother Paternal Grandmother     Atrial fibrillation Paternal Grandmother Paternal Grandmother     Blood Disorder Paternal Grandmother Paternal  "Grandmother     Heart disease Paternal Grandmother Paternal Grandmother     Alcohol abuse Mother's Brother Uncle     Genetic Testing Mother's Sister Aunt         Allergies:  Bactrim [sulfamethoxazole-trimethoprim]    Outpatient Medications:  Current Outpatient Medications   Medication Instructions    clotrimazole-betamethasone (Lotrisone) cream APPLY SPARINGLY TO AFFECTED AREA 3 TIMES A DAY    LORazepam (ATIVAN) 0.5 mg, oral, Once    norethindrone-e.estradioL-iron (Junel FE 1/20) 1 mg-20 mcg (21)/75 mg (7) tablet 1 tablet, oral, Daily        ROS:  A 14 point review of systems was done and is negative other than as stated in HPI    Vitals:      4/26/2023     5:38 PM 8/7/2023     9:09 AM 10/2/2023     1:04 PM 3/22/2024     1:19 PM 3/31/2024    12:54 PM 4/8/2024    12:48 PM 4/19/2024    10:23 AM   Vitals   Systolic 125 102 112 120  130 110   Diastolic 90 70 74 80  98 70   Heart Rate 84    89 87    Temp 36.6 °C (97.8 °F)    36.8 °C (98.2 °F)     Resp 16    18     Height (in)  1.575 m (5' 2\") 1.575 m (5' 2\")  1.575 m (5' 2\") 1.575 m (5' 2\")    Weight (lb)  153.66 151 165 160 163    BMI  28.1 kg/m2 27.62 kg/m2 30.18 kg/m2 29.26 kg/m2 29.81 kg/m2    BSA (m2)  1.75 m2 1.73 m2 1.81 m2 1.78 m2 1.8 m2         Physical Exam:   Constitutional: Cooperative, in no acute distress, alert, appears stated age.  Skin: Skin color, texture, turgor normal. No rashes or lesions.  Head: Normocephalic. No masses, lesions, tenderness or abnormalities  Eyes: Extraocular movements are grossly intact.  Mouth and throat: Mucous membranes moist  Neck: Neck supple, no carotid bruits, no JVD  Respiratory: Lungs clear to auscultation, no wheezing or rhonchi, no use of accessory muscles  Chest wall: No scars, normal excursion with respiration  Cardiovascular: Regular rhythm without murmur  Gastrointestinal: Abdomen soft, nontender. Bowel sounds normal.  Musculoskeletal: Strength equal in upper extremities  Extremities: No pitting edema  Neurologic: " Sensation grossly intact, alert and oriented ×3    Intake/Output:   No intake/output data recorded.    Outpatient Medications  Current Outpatient Medications on File Prior to Visit   Medication Sig Dispense Refill    clotrimazole-betamethasone (Lotrisone) cream APPLY SPARINGLY TO AFFECTED AREA 3 TIMES A DAY 45 g 1    LORazepam (Ativan) 0.5 mg tablet Take 1 tablet (0.5 mg) by mouth 1 time for 1 dose. 1 tablet 0    norethindrone-e.estradioL-iron (Junel FE 1/20) 1 mg-20 mcg (21)/75 mg (7) tablet Take 1 tablet by mouth once daily. 28 tablet 11     No current facility-administered medications on file prior to visit.       Labs: (past 26 weeks)  Recent Results (from the past 4368 hour(s))   POCT pregnancy, urine manually resulted    Collection Time: 03/22/24  1:57 PM   Result Value Ref Range    Preg Test, Ur Negative Negative   CBC and Auto Differential    Collection Time: 03/31/24  3:56 PM   Result Value Ref Range    WBC 10.2 4.4 - 11.3 x10*3/uL    nRBC 0.0 0.0 - 0.0 /100 WBCs    RBC 4.81 4.00 - 5.20 x10*6/uL    Hemoglobin 15.4 12.0 - 16.0 g/dL    Hematocrit 42.3 36.0 - 46.0 %    MCV 88 80 - 100 fL    MCH 32.0 26.0 - 34.0 pg    MCHC 36.4 (H) 32.0 - 36.0 g/dL    RDW 11.5 11.5 - 14.5 %    Platelets 306 150 - 450 x10*3/uL    Neutrophils % 63.2 40.0 - 80.0 %    Immature Granulocytes %, Automated 0.2 0.0 - 0.9 %    Lymphocytes % 27.8 13.0 - 44.0 %    Monocytes % 6.1 2.0 - 10.0 %    Eosinophils % 1.6 0.0 - 6.0 %    Basophils % 1.1 0.0 - 2.0 %    Neutrophils Absolute 6.43 1.20 - 7.70 x10*3/uL    Immature Granulocytes Absolute, Automated 0.02 0.00 - 0.70 x10*3/uL    Lymphocytes Absolute 2.82 1.20 - 4.80 x10*3/uL    Monocytes Absolute 0.62 0.10 - 1.00 x10*3/uL    Eosinophils Absolute 0.16 0.00 - 0.70 x10*3/uL    Basophils Absolute 0.11 (H) 0.00 - 0.10 x10*3/uL   Magnesium    Collection Time: 03/31/24  3:56 PM   Result Value Ref Range    Magnesium 1.91 1.60 - 2.40 mg/dL   Comprehensive metabolic panel    Collection Time: 03/31/24   3:56 PM   Result Value Ref Range    Glucose 82 74 - 99 mg/dL    Sodium 136 136 - 145 mmol/L    Potassium 4.2 3.5 - 5.3 mmol/L    Chloride 104 98 - 107 mmol/L    Bicarbonate 25 21 - 32 mmol/L    Anion Gap 11 10 - 20 mmol/L    Urea Nitrogen 11 6 - 23 mg/dL    Creatinine 0.74 0.50 - 1.05 mg/dL    eGFR >90 >60 mL/min/1.73m*2    Calcium 9.2 8.6 - 10.3 mg/dL    Albumin 4.4 3.4 - 5.0 g/dL    Alkaline Phosphatase 49 33 - 110 U/L    Total Protein 7.6 6.4 - 8.2 g/dL    AST 15 9 - 39 U/L    Bilirubin, Total 0.4 0.0 - 1.2 mg/dL    ALT 8 7 - 45 U/L   hCG, Urine, Qualitative    Collection Time: 03/31/24  3:56 PM   Result Value Ref Range    HCG, Urine NEGATIVE NEGATIVE   TSH with reflex to Free T4 if abnormal    Collection Time: 03/31/24  3:56 PM   Result Value Ref Range    Thyroid Stimulating Hormone 1.58 0.44 - 3.98 mIU/L   ECG 12 lead    Collection Time: 03/31/24  3:59 PM   Result Value Ref Range    Ventricular Rate 66 BPM    Atrial Rate 66 BPM    GA Interval 126 ms    QRS Duration 94 ms    QT Interval 373 ms    QTC Calculation(Bazett) 391 ms    P Axis 2 degrees    R Axis 31 degrees    T Axis 27 degrees    QRS Count 11 beats    Q Onset 249 ms    T Offset 436 ms    QTC Fredericia 385 ms   Transthoracic Echo (TTE) Complete    Collection Time: 04/25/24  2:52 PM   Result Value Ref Range    LVOT diam 1.91 cm    LV Biplane EF 70 %    MV E/A ratio 1.22     MV avg E/e' ratio 7.12     Tricuspid annular plane systolic excursion 2.1 cm    AV mn grad 3.0 mmHg    LA vol index A/L 13.6 ml/m2    AV pk john 1.20 m/s    RV free wall pk S' 8.65 cm/s    LVIDd 4.19 cm    Aortic Valve Area by Continuity of VTI 2.58 cm2    Aortic Valve Area by Continuity of Peak Velocity 2.58 cm2    AV pk grad 5.7 mmHg    LV A4C EF 72.2        ECG  Encounter Date: 04/08/24   ECG 12 Lead    Narrative    Sinus rhythm heart rate 87 bpm.       Echocardiogram  No results found for this or any previous visit from the past 1095 days.      CV Studies:  EKG:  Encounter Date:  04/08/24   ECG 12 Lead    Narrative    Sinus rhythm heart rate 87 bpm.     Echocardiogram: No results found for this or any previous visit from the past 1825 days.    Stress Testing IMGRESULT(UEK1732:1:1825): No results found for this or any previous visit from the past 1825 days.    Cardiac Catheterization: No results found for this or any previous visit from the past 1825 days.  No results found for this or any previous visit from the past 3650 days.     Cardiac Scoring: No results found for this or any previous visit from the past 1825 days.    AAA : No results found for this or any previous visit from the past 1825 days.    OTHER: No results found for this or any previous visit from the past 1825 days.    LAST IMAGING RESULTS  Transthoracic Echo (TTE) [place] [address]       Phone [phone] Fax [phone]    TRANSTHORACIC ECHOCARDIOGRAM REPORT       Patient Name:      AUSTIN Abernathy Physician:    [id number] Yumi Burgess DO  Study Date:        4/25/2024            Ordering Provider:    [id number] YUMI BURGESS  MRN/PID:           [id number]             Fellow:  Accession#:        [id number]         Nurse:  Date of Birth/Age: 1995 / 28      Sonographer:          Olamide Lyon RDCS                     years  Gender:            F                    Additional Staff:     Andreina Bruno                                                                Student  Height:            157.48 cm            Admit Date:  Weight:            73.94 kg             Admission Status:     Outpatient  BSA / BMI:         1.75 m2 / 29.81      Department Location:  [facility] Echo                     kg/m2                                      Lab  Blood Pressure: 110 /70 mmHg    Study Type:    TRANSTHORACIC ECHO (TTE) COMPLETE  Diagnosis/ICD: Palpitations-R00.2; Dizziness and  giddiness-R42  Indication:    Palpitations, Dizziness  CPT Codes:     Echo Complete w Full Doppler-[id number]   Study Detail: The following Echo studies were performed: 2D, M-Mode, Doppler and                color flow. Technically challenging study due to prominent lung                artifact.       PHYSICIAN INTERPRETATION:  Left Ventricle: Left ventricular systolic function is normal, with an estimated ejection fraction of 65%. There are no regional wall motion abnormalities. The left ventricular cavity size is normal. Spectral Doppler shows a normal pattern of left ventricular diastolic filling.  Left Atrium: The left atrium is normal in size.  Right Ventricle: The right ventricle is normal in size. There is normal right ventricular global systolic function.  Right Atrium: The right atrium is normal in size.  Aortic Valve: The aortic valve is trileaflet. There is no evidence of aortic valve regurgitation. The peak instantaneous gradient of the aortic valve is 5.7 mmHg. The mean gradient of the aortic valve is 3.0 mmHg.  Mitral Valve: The mitral valve is normal in structure. There is no evidence of mitral valve regurgitation.  Tricuspid Valve: The tricuspid valve is structurally normal. There is trace tricuspid regurgitation.  Pulmonic Valve: The pulmonic valve is not well visualized. There is no indication of pulmonic valve regurgitation.  Pericardium: There is no pericardial effusion noted.  Aorta: The aortic root is normal.       CONCLUSIONS:   1. Left ventricular systolic function is normal with a 65% estimated ejection fraction.    QUANTITATIVE DATA SUMMARY:  2D MEASUREMENTS:                           Normal Ranges:  LAs:           3.02 cm   (2.7-4.0cm)  IVSd:          0.72 cm   (0.6-1.1cm)  LVPWd:         0.81 cm   (0.6-1.1cm)  LVIDd:         4.19 cm   (3.9-5.9cm)  LVIDs:         2.28 cm  LV Mass Index: 54.4 g/m2  LV % FS        45.5 %    LA VOLUME:                                Normal Ranges:  LA Vol A4C:         19.2 ml    (22+/-6mL/m2)  LA Vol A2C:        27.4 ml  LA Vol BP:         23.9 ml  LA Vol Index A4C:  10.9 ml/m2  LA Vol Index A2C:  15.6 ml/m2  LA Vol Index BP:   13.6 ml/m2  LA Area A4C:       10.4 cm2  LA Area A2C:       11.9 cm2  LA Major Axis A4C: 4.8 cm  LA Major Axis A2C: 4.4 cm  LA Vol A4C:        18.1 ml  LA Vol A2C:        26.1 ml    RA VOLUME BY A/L METHOD:                       Normal Ranges:  RA Area A4C: 6.0 cm2    AORTA MEASUREMENTS:                       Normal Ranges:  Ao Sinus, d: 3.10 cm (2.1-3.5cm)  Ao STJ, d:   2.90 cm (1.7-3.4cm)  Asc Ao, d:   3.00 cm (2.1-3.4cm)    LV SYSTOLIC FUNCTION BY 2D PLANIMETRY (MOD):                      Normal Ranges:  EF-A4C View: 72.2 % (>=55%)  EF-A2C View: 70.9 %  EF-Biplane:  69.5 %    LV DIASTOLIC FUNCTION:                            Normal Ranges:  MV Peak E:    0.85 m/s    (0.7-1.2 m/s)  MV Peak A:    0.70 m/s    (0.42-0.7 m/s)  E/A Ratio:    1.22        (1.0-2.2)  MV e'         0.12 m/s    (>8.0)  MV lateral e' 0.15 m/s  MV medial e'  0.09 m/s  MV A Dur:     119.89 msec  E/e' Ratio:   7.12        (<8.0)    MITRAL VALVE:                  Normal Ranges:  MV DT: 176 msec (150-240msec)    AORTIC VALVE:                                    Normal Ranges:  AoV Vmax:                1.20 m/s (<=1.7m/s)  AoV Peak P.7 mmHg (<20mmHg)  AoV Mean PG:             3.0 mmHg (1.7-11.5mmHg)  LVOT Max Rajat:            1.08 m/s (<=1.1m/s)  AoV VTI:                 23.19 cm (18-25cm)  LVOT VTI:                21.00 cm  LVOT Diameter:           1.91 cm  (1.8-2.4cm)  AoV Area, VTI:           2.58 cm2 (2.5-5.5cm2)  AoV Area,Vmax:           2.58 cm2 (2.5-4.5cm2)  AoV Dimensionless Index: 0.91       RIGHT VENTRICLE:  RV Basal 2.07 cm  RV Mid   1.40 cm  RV Major 5.6 cm  TAPSE:   21.1 mm  RV s'    0.09 m/s    TRICUSPID VALVE/RVSP:                    Normal Ranges:  IVC Diam: 1.41 cm  TV e'     0.1 m/s    AORTA:  Asc Ao Diam 2.99 cm       [id number] Partha Meneses  DO  Electronically signed on 4/25/2024 at 5:02:59 PM       ** Final **  [place] [address]       Phone [phone] Fax [phone]    Exercise Stress Test    Patient Name:      AUSTIN MATTHEWS  Ordering Provider:     [id number] YUMI MENESES  Study Date:        4/25/2024           Reading Physician:     [id number] Yumi Meneses DO  MRN/PID:           [id number]            Supervising Physician: [id number] Yumi Meneses DO  Accession#:        [id number]        Fellow:  Date of Birth/Age: 1995 / 28     Fellow:                     years  Gender:            F                   Nurse:                 Aminta White RN  Admission Status:  Outpatient          Sonographer:           N/A  Height:            157.48 cm           Technologist:  Weight:            72.58 kg            Additional Staff:  BSA:               1.74 m2             Encounter#:            [id number]  BMI:               29.26 kg/m2         Patient [facility]      [facility]    Study Type:    STRESS TEST ONLY  Diagnosis/ICD: Palpitations-R00.2; Shortness of breath-R06.02; Dizziness and                 giddiness-R42  Indication:    palpitations, dizziness and Dyspnea    Falls Risk:     Study Details: Correct procedure and correct patient verified verbally and with                 ID Band checked.       Patient History:  Allergies: Bactrim.       Medications: Ibuprofen. The patient did not take medications as prescribed.     Patient Performance: The patient exercised to stage III on a Man protocol for 8 minutes and 36 seconds, achieving 10.1 METS. The peak heart rate achieved was 171 bpm, which was 89 % of the age predicted target heart rate of 192 bpm. The resting blood pressure was 134/87 mmHg with a  heart rate of 72 bpm. The standing blood pressure was 145/95 mmHg with a heart rate of 82 bpm. The patient's functional capacity was average. The patient developed fatigue during the stress exam. The symptoms resolved with rest. The blood pressure response was hypertensive. The test was terminated due to: fatigue. Patient has met the discharge criteria and is discharged to home.     70% maximum predicted heart rate (MPHR) is 134 bpm.  85% maximum predicted heart rate (MPHR) is 163 bpm.  100% maximum predicted heart rate (MPHR) is 192 bpm.  Peak Oxygen Use: 30-33 ml/kg/min.  Double Product (HR x BP): 352.  Exercise Capacity: 70% Achieved HR = 4.6 METS : 85% Achieved HR = 10.1 METS.       Baseline ECG: Resting ECG showed normal sinus rhythm with normal tracing.     Summary:   1. Adequate level of stress achieved.   2. No clinical or electrocardiographic evidence for ischemia at maximal workload.   3. No ischemia by ECG at max workload.    [id number] Partha Meneses DO  Electronically signed on 4/25/2024 at 3:11:43 PM            ** Final (Updated) **  Holter Or Event Cardiac Monitor  Please see scanned results.      Problem List Items Addressed This Visit       Palpitations - Primary    Shortness of breath    Lightheadedness    Dizziness          Partha Meneses DO, FACC, FACOI

## 2024-05-24 ENCOUNTER — OFFICE VISIT (OUTPATIENT)
Dept: CARDIOLOGY | Facility: CLINIC | Age: 29
End: 2024-05-24

## 2024-05-24 VITALS
HEIGHT: 62 IN | OXYGEN SATURATION: 98 % | WEIGHT: 160.4 LBS | SYSTOLIC BLOOD PRESSURE: 122 MMHG | BODY MASS INDEX: 29.52 KG/M2 | HEART RATE: 87 BPM | DIASTOLIC BLOOD PRESSURE: 100 MMHG

## 2024-05-24 DIAGNOSIS — R42 LIGHTHEADEDNESS: ICD-10-CM

## 2024-05-24 DIAGNOSIS — R06.02 SHORTNESS OF BREATH: ICD-10-CM

## 2024-05-24 DIAGNOSIS — R00.2 PALPITATIONS: Primary | ICD-10-CM

## 2024-05-24 DIAGNOSIS — R42 DIZZINESS: ICD-10-CM

## 2024-05-24 PROCEDURE — 99213 OFFICE O/P EST LOW 20 MIN: CPT | Performed by: INTERNAL MEDICINE

## 2024-05-24 PROCEDURE — 93000 ELECTROCARDIOGRAM COMPLETE: CPT | Performed by: INTERNAL MEDICINE

## 2024-05-24 PROCEDURE — 1036F TOBACCO NON-USER: CPT | Performed by: INTERNAL MEDICINE

## 2024-05-24 RX ORDER — METHYLPHENIDATE HYDROCHLORIDE 27 MG/1
27 TABLET ORAL EVERY MORNING
COMMUNITY

## 2024-05-24 RX ORDER — ESCITALOPRAM OXALATE 10 MG/1
10 TABLET ORAL DAILY
COMMUNITY

## 2024-05-27 ENCOUNTER — HOSPITAL ENCOUNTER (EMERGENCY)
Facility: HOSPITAL | Age: 29
Discharge: HOME | End: 2024-05-28

## 2024-05-27 ENCOUNTER — APPOINTMENT (OUTPATIENT)
Dept: RADIOLOGY | Facility: HOSPITAL | Age: 29
End: 2024-05-27

## 2024-05-27 DIAGNOSIS — R00.2 PALPITATIONS: ICD-10-CM

## 2024-05-27 DIAGNOSIS — R42 DIZZINESS: Primary | ICD-10-CM

## 2024-05-27 LAB
ALBUMIN SERPL BCP-MCNC: 4.2 G/DL (ref 3.4–5)
ALP SERPL-CCNC: 48 U/L (ref 33–110)
ALT SERPL W P-5'-P-CCNC: 12 U/L (ref 7–45)
ANION GAP SERPL CALC-SCNC: 11 MMOL/L (ref 10–20)
APPEARANCE UR: CLEAR
AST SERPL W P-5'-P-CCNC: 13 U/L (ref 9–39)
B-HCG SERPL-ACNC: <2 MIU/ML
BACTERIA #/AREA URNS AUTO: ABNORMAL /HPF
BASOPHILS # BLD AUTO: 0.13 X10*3/UL (ref 0–0.1)
BASOPHILS NFR BLD AUTO: 1.1 %
BILIRUB DIRECT SERPL-MCNC: 0 MG/DL (ref 0–0.3)
BILIRUB SERPL-MCNC: 0.3 MG/DL (ref 0–1.2)
BILIRUB UR STRIP.AUTO-MCNC: NEGATIVE MG/DL
BUN SERPL-MCNC: 13 MG/DL (ref 6–23)
CALCIUM SERPL-MCNC: 9.5 MG/DL (ref 8.6–10.3)
CARDIAC TROPONIN I PNL SERPL HS: 3 NG/L (ref 0–13)
CARDIAC TROPONIN I PNL SERPL HS: <3 NG/L (ref 0–13)
CHLORIDE SERPL-SCNC: 106 MMOL/L (ref 98–107)
CO2 SERPL-SCNC: 24 MMOL/L (ref 21–32)
COLOR UR: ABNORMAL
CREAT SERPL-MCNC: 0.63 MG/DL (ref 0.5–1.05)
EGFRCR SERPLBLD CKD-EPI 2021: >90 ML/MIN/1.73M*2
EOSINOPHIL # BLD AUTO: 0.2 X10*3/UL (ref 0–0.7)
EOSINOPHIL NFR BLD AUTO: 1.7 %
ERYTHROCYTE [DISTWIDTH] IN BLOOD BY AUTOMATED COUNT: 12.4 % (ref 11.5–14.5)
GLUCOSE SERPL-MCNC: 89 MG/DL (ref 74–99)
GLUCOSE UR STRIP.AUTO-MCNC: NORMAL MG/DL
HCT VFR BLD AUTO: 41.2 % (ref 36–46)
HGB BLD-MCNC: 14.9 G/DL (ref 12–16)
IMM GRANULOCYTES # BLD AUTO: 0.04 X10*3/UL (ref 0–0.7)
IMM GRANULOCYTES NFR BLD AUTO: 0.3 % (ref 0–0.9)
KETONES UR STRIP.AUTO-MCNC: NEGATIVE MG/DL
LEUKOCYTE ESTERASE UR QL STRIP.AUTO: NEGATIVE
LYMPHOCYTES # BLD AUTO: 3.4 X10*3/UL (ref 1.2–4.8)
LYMPHOCYTES NFR BLD AUTO: 29.2 %
MAGNESIUM SERPL-MCNC: 2.06 MG/DL (ref 1.6–2.4)
MCH RBC QN AUTO: 32.1 PG (ref 26–34)
MCHC RBC AUTO-ENTMCNC: 36.2 G/DL (ref 32–36)
MCV RBC AUTO: 89 FL (ref 80–100)
MONOCYTES # BLD AUTO: 1.05 X10*3/UL (ref 0.1–1)
MONOCYTES NFR BLD AUTO: 9 %
MUCOUS THREADS #/AREA URNS AUTO: ABNORMAL /LPF
NEUTROPHILS # BLD AUTO: 6.81 X10*3/UL (ref 1.2–7.7)
NEUTROPHILS NFR BLD AUTO: 58.7 %
NITRITE UR QL STRIP.AUTO: NEGATIVE
NRBC BLD-RTO: 0 /100 WBCS (ref 0–0)
PH UR STRIP.AUTO: 6.5 [PH]
PLATELET # BLD AUTO: 308 X10*3/UL (ref 150–450)
POTASSIUM SERPL-SCNC: 4 MMOL/L (ref 3.5–5.3)
PROT SERPL-MCNC: 7.5 G/DL (ref 6.4–8.2)
PROT UR STRIP.AUTO-MCNC: NEGATIVE MG/DL
RBC # BLD AUTO: 4.64 X10*6/UL (ref 4–5.2)
RBC # UR STRIP.AUTO: ABNORMAL /UL
RBC #/AREA URNS AUTO: ABNORMAL /HPF
SODIUM SERPL-SCNC: 137 MMOL/L (ref 136–145)
SP GR UR STRIP.AUTO: 1.02
SQUAMOUS #/AREA URNS AUTO: ABNORMAL /HPF
UROBILINOGEN UR STRIP.AUTO-MCNC: NORMAL MG/DL
WBC # BLD AUTO: 11.6 X10*3/UL (ref 4.4–11.3)
WBC #/AREA URNS AUTO: ABNORMAL /HPF

## 2024-05-27 PROCEDURE — 99283 EMERGENCY DEPT VISIT LOW MDM: CPT

## 2024-05-27 PROCEDURE — 84484 ASSAY OF TROPONIN QUANT: CPT | Performed by: PHYSICIAN ASSISTANT

## 2024-05-27 PROCEDURE — 84702 CHORIONIC GONADOTROPIN TEST: CPT | Performed by: PHYSICIAN ASSISTANT

## 2024-05-27 PROCEDURE — 85025 COMPLETE CBC W/AUTO DIFF WBC: CPT | Performed by: PHYSICIAN ASSISTANT

## 2024-05-27 PROCEDURE — 71045 X-RAY EXAM CHEST 1 VIEW: CPT

## 2024-05-27 PROCEDURE — 36415 COLL VENOUS BLD VENIPUNCTURE: CPT | Performed by: PHYSICIAN ASSISTANT

## 2024-05-27 PROCEDURE — 82247 BILIRUBIN TOTAL: CPT | Performed by: PHYSICIAN ASSISTANT

## 2024-05-27 PROCEDURE — 71045 X-RAY EXAM CHEST 1 VIEW: CPT | Performed by: RADIOLOGY

## 2024-05-27 PROCEDURE — 93005 ELECTROCARDIOGRAM TRACING: CPT

## 2024-05-27 PROCEDURE — 82248 BILIRUBIN DIRECT: CPT | Performed by: PHYSICIAN ASSISTANT

## 2024-05-27 PROCEDURE — 81001 URINALYSIS AUTO W/SCOPE: CPT | Performed by: PHYSICIAN ASSISTANT

## 2024-05-27 PROCEDURE — 83735 ASSAY OF MAGNESIUM: CPT | Performed by: PHYSICIAN ASSISTANT

## 2024-05-27 ASSESSMENT — COLUMBIA-SUICIDE SEVERITY RATING SCALE - C-SSRS
6. HAVE YOU EVER DONE ANYTHING, STARTED TO DO ANYTHING, OR PREPARED TO DO ANYTHING TO END YOUR LIFE?: NO
2. HAVE YOU ACTUALLY HAD ANY THOUGHTS OF KILLING YOURSELF?: NO
1. IN THE PAST MONTH, HAVE YOU WISHED YOU WERE DEAD OR WISHED YOU COULD GO TO SLEEP AND NOT WAKE UP?: NO

## 2024-05-28 ENCOUNTER — APPOINTMENT (OUTPATIENT)
Dept: CARDIOLOGY | Facility: HOSPITAL | Age: 29
End: 2024-05-28

## 2024-05-28 ENCOUNTER — TELEPHONE (OUTPATIENT)
Dept: OBSTETRICS AND GYNECOLOGY | Facility: CLINIC | Age: 29
End: 2024-05-28

## 2024-05-28 VITALS
OXYGEN SATURATION: 99 % | RESPIRATION RATE: 16 BRPM | SYSTOLIC BLOOD PRESSURE: 145 MMHG | HEIGHT: 62 IN | TEMPERATURE: 98.7 F | HEART RATE: 59 BPM | WEIGHT: 160 LBS | BODY MASS INDEX: 29.44 KG/M2 | DIASTOLIC BLOOD PRESSURE: 95 MMHG

## 2024-05-28 LAB
ATRIAL RATE: 68 BPM
HOLD SPECIMEN: NORMAL
P AXIS: 71 DEGREES
PR INTERVAL: 132 MS
Q ONSET: 253 MS
QRS COUNT: 11 BEATS
QRS DURATION: 105 MS
QT INTERVAL: 373 MS
QTC CALCULATION(BAZETT): 400 MS
QTC FREDERICIA: 391 MS
R AXIS: 58 DEGREES
T AXIS: 37 DEGREES
T OFFSET: 439 MS
VENTRICULAR RATE: 69 BPM

## 2024-05-28 ASSESSMENT — LIFESTYLE VARIABLES
HAVE PEOPLE ANNOYED YOU BY CRITICIZING YOUR DRINKING: NO
EVER HAD A DRINK FIRST THING IN THE MORNING TO STEADY YOUR NERVES TO GET RID OF A HANGOVER: NO
EVER FELT BAD OR GUILTY ABOUT YOUR DRINKING: NO
TOTAL SCORE: 0
HAVE YOU EVER FELT YOU SHOULD CUT DOWN ON YOUR DRINKING: NO

## 2024-05-28 NOTE — TELEPHONE ENCOUNTER
Hi,   Thanks for the info.   I will see her next week at her follow-up. If she needs to be sooner, her appointment can be moved up.  Thanks

## 2024-05-28 NOTE — ED PROVIDER NOTES
EMERGENCY MEDICINE EVALUATION NOTE    History of Present Illness     Chief Complaint:   Chief Complaint   Patient presents with    Dizziness     Pt c/o FLS/Dizziness, n/diarrhea, Pt states her psych meds have been getting switched around, she is unsure if all her meds should be taken together       HPI: Kiera Vuong is a 28 y.o. female presents with a chief complaint of multiple medical complaints.  Patient reports that she thinks is having a reaction to her psychiatric medications.  She states she was not on psychiatric meds for a long time however her provider recently started her back on the meds.  She states that she initially started on 1 psychiatric medication but she did not like the side effect so she switched back to her Lexapro.  Patient was that she took her Lexapro and some fluconazole to treat a yeast infection patient states that afterward she had to develop some palpitations with lightheadedness dizziness.  Patient reports that she thinks that she is taking too many medications at once and it is affecting her body.  Patient denies any current chest pain or shortness of breath.  She does note that she has a history of hypertension but it is worse when she is around doctors.  Patient states that she has no headaches or change in vision.  Patient reports that she just feels abnormal currently.  She denies any chance of pregnancy.    Previous History     Past Medical History:   Diagnosis Date    Depression     Interstitial cystitis (chronic) without hematuria     Interstitial cystitis    Kidney stone     Personal history of other diseases of the respiratory system     History of asthma    Personal history of other mental and behavioral disorders     History of anxiety disorder    Personal history of other specified conditions 01/29/2019    History of dysuria    Personal history of urinary (tract) infections 01/29/2019    History of urinary tract infection    Urinary tract infection      Past  Surgical History:   Procedure Laterality Date    CYSTOSCOPY W/ URETERAL STENT REMOVAL      EXPLORATORY LAPAROTOMY      KIDNEY STONE SURGERY      OTHER SURGICAL HISTORY  06/02/2022    Bladder surgery    RHINOPLASTY  10/04/2018    Rhinoplasty    SEPTOPLASTY  10/04/2018    Septoplasty     Social History     Tobacco Use    Smoking status: Never    Smokeless tobacco: Never   Vaping Use    Vaping status: Former    Quit date: 3/22/2023    Substances: THC    Devices: Instantis tank   Substance Use Topics    Alcohol use: Yes     Alcohol/week: 4.0 standard drinks of alcohol     Types: 4 Glasses of wine per week     Comment: 2-4 glasses of wine a week    Drug use: Not Currently     Family History   Problem Relation Name Age of Onset    Asthma Mother Mom     Kidney nephrosis Father Dad     Depression Father Dad     Heart disease Maternal Grandfather Maternal Grandfather     Kidney disease Maternal Grandfather Maternal Grandfather     Stroke Maternal Grandfather Maternal Grandfather     Diabetes Paternal Grandfather Maternal Grandfather     Arthritis Paternal Grandmother Paternal Grandmother     Atrial fibrillation Paternal Grandmother Paternal Grandmother     Blood Disorder Paternal Grandmother Paternal Grandmother     Heart disease Paternal Grandmother Paternal Grandmother     Alcohol abuse Mother's Brother Uncle     Genetic Testing Mother's Sister Aunt      Allergies   Allergen Reactions    Bactrim [Sulfamethoxazole-Trimethoprim] Unknown     Current Outpatient Medications   Medication Instructions    clotrimazole-betamethasone (Lotrisone) cream APPLY SPARINGLY TO AFFECTED AREA 3 TIMES A DAY    escitalopram (LEXAPRO) 10 mg, oral, Daily    fluconazole (Diflucan) 150 mg tablet TAKE 1 TABLET BY MOUTH AS ONE DOSE    LORazepam (ATIVAN) 0.5 mg, oral, Once    methylphenidate ER (CONCERTA) 27 mg, oral, Every morning, Do not crush, chew, or split.    norethindrone-e.estradioL-iron (Junel FE 1/20) 1 mg-20 mcg (21)/75 mg (7) tablet 1  tablet, oral, Daily       Physical Exam     Appearance: Alert, oriented , cooperative     Skin: Intact,  dry skin, no lesions, rash, petechiae or purpura.      Eyes: PERRLA, EOMs intact,  Conjunctiva pink      ENT: Hearing grossly intact. Pharynx clear, uvula midline.      Neck: Supple. Trachea at midline.      Pulmonary: Clear bilaterally. No rales, rhonchi or wheezing. No accessory muscle use or stridor.     Cardiac: Normal rate and rhythm without murmur     Abdomen: Soft, nontender, active bowel sounds.     Musculoskeletal: Full range of motion.      Neurological:Cranial nerves II through XII are grossly intact, normal sensation, no weakness, no focal findings identified.     Results     Labs Reviewed   CBC WITH AUTO DIFFERENTIAL - Abnormal       Result Value    WBC 11.6 (*)     nRBC 0.0      RBC 4.64      Hemoglobin 14.9      Hematocrit 41.2      MCV 89      MCH 32.1      MCHC 36.2 (*)     RDW 12.4      Platelets 308      Neutrophils % 58.7      Immature Granulocytes %, Automated 0.3      Lymphocytes % 29.2      Monocytes % 9.0      Eosinophils % 1.7      Basophils % 1.1      Neutrophils Absolute 6.81      Immature Granulocytes Absolute, Automated 0.04      Lymphocytes Absolute 3.40      Monocytes Absolute 1.05 (*)     Eosinophils Absolute 0.20      Basophils Absolute 0.13 (*)    URINALYSIS WITH REFLEX CULTURE AND MICROSCOPIC - Abnormal    Color, Urine Light-Yellow      Appearance, Urine Clear      Specific Gravity, Urine 1.016      pH, Urine 6.5      Protein, Urine NEGATIVE      Glucose, Urine Normal      Blood, Urine 0.03 (TRACE) (*)     Ketones, Urine NEGATIVE      Bilirubin, Urine NEGATIVE      Urobilinogen, Urine Normal      Nitrite, Urine NEGATIVE      Leukocyte Esterase, Urine NEGATIVE     URINALYSIS MICROSCOPIC WITH REFLEX CULTURE - Abnormal    WBC, Urine 1-5      RBC, Urine 3-5      Squamous Epithelial Cells, Urine 1-9 (SPARSE)      Bacteria, Urine 1+ (*)     Mucus, Urine FEW     COMPREHENSIVE METABOLIC  PANEL - Normal    Glucose 89      Sodium 137      Potassium 4.0      Chloride 106      Bicarbonate 24      Anion Gap 11      Urea Nitrogen 13      Creatinine 0.63      eGFR >90      Calcium 9.5      Albumin 4.2      Alkaline Phosphatase 48      Total Protein 7.5      AST 13      Bilirubin, Total 0.3      ALT 12     MAGNESIUM - Normal    Magnesium 2.06     HUMAN CHORIONIC GONADOTROPIN, SERUM QUANTITATIVE - Normal    HCG, Beta-Quantitative <2      Narrative:      Total HCG measurement is performed using the [employer] Access   Immunoassay which detects intact HCG and free beta HCG subunit.    This test is not indicated for use as a tumor marker.   HCG testing is performed using a different test methodology at [facility] than other [facility]. Direct result comparison   should only be made within the same method.       SERIAL TROPONIN-INITIAL - Normal    Troponin I, High Sensitivity <3      Narrative:     Less than 99th percentile of normal range cutoff-  Female and children under 18 years old <14 ng/L; Male <21 ng/L: Negative  Repeat testing should be performed if clinically indicated.     Female and children under 18 years old 14-50 ng/L; Male 21-50 ng/L:  Consistent with possible cardiac damage and possible increased clinical   risk. Serial measurements may help to assess extent of myocardial damage.     >50 ng/L: Consistent with cardiac damage, increased clinical risk and  myocardial infarction. Serial measurements may help assess extent of   myocardial damage.      NOTE: Children less than 1 year old may have higher baseline troponin   levels and results should be interpreted in conjunction with the overall   clinical context.     NOTE: Troponin I testing is performed using a different   testing methodology at [facility] than at other   [facility]. Direct result comparisons should only   be made within the same method.   BILIRUBIN, DIRECT - Normal    Bilirubin, Direct  0.0     SERIAL TROPONIN, 1 HOUR - Normal    Troponin I, High Sensitivity 3      Narrative:     Less than 99th percentile of normal range cutoff-  Female and children under 18 years old <14 ng/L; Male <21 ng/L: Negative  Repeat testing should be performed if clinically indicated.     Female and children under 18 years old 14-50 ng/L; Male 21-50 ng/L:  Consistent with possible cardiac damage and possible increased clinical   risk. Serial measurements may help to assess extent of myocardial damage.     >50 ng/L: Consistent with cardiac damage, increased clinical risk and  myocardial infarction. Serial measurements may help assess extent of   myocardial damage.      NOTE: Children less than 1 year old may have higher baseline troponin   levels and results should be interpreted in conjunction with the overall   clinical context.     NOTE: Troponin I testing is performed using a different   testing methodology at [facility] than at other   [facility]. Direct result comparisons should only   be made within the same method.   TROPONIN SERIES- (INITIAL, 1 HR)    Narrative:     The following orders were created for panel order Troponin I Series, High Sensitivity (0, 1 HR).  Procedure                               Abnormality         Status                     ---------                               -----------         ------                     Troponin I, High Sensiti...[[id number]]  Normal              Final result               Troponin, High Sensitivi...[[id number]]  Normal              Final result                 Please view results for these tests on the individual orders.   URINALYSIS WITH REFLEX CULTURE AND MICROSCOPIC    Narrative:     The following orders were created for panel order Urinalysis with Reflex Culture and Microscopic.  Procedure                               Abnormality         Status                     ---------                               -----------         ------                 "     Urinalysis with Reflex C...[[id number]]  Abnormal            Final result               Extra Urine Gray Tube[[id number]]                            In process                   Please view results for these tests on the individual orders.   EXTRA URINE GRAY TUBE     XR chest 1 view   Final Result   No airspace consolidation or pleural effusion.        MACRO:   None        Signed by: Ankit Koenig 5/27/2024 10:28 PM   Dictation workstation:   [id number]            ED Course & Medical Decision Making   Medications - No data to display  Heart Rate:  [59-75]   Temperature:  [37.1 °C (98.7 °F)]   Respirations:  [16-20]   BP: (145-190)/()   Height:  [157.5 cm (5' 2\")]   Weight:  [72.6 kg (160 lb)]   Pulse Ox:  [99 %-100 %]    ED Course as of 05/28/24 0033   Tue May 28, 2024   0010 Updated the patient on the results.  Patient's workup today did not show any specific abnormalities.  Patient did have hypertension but she had no symptoms related to it other than palpitations.  She had no headache or change in vision.  Patient's workup showed a slight leukocytosis of 11.6 however she has chronically elevated white blood cell count.  This is about her standard.  Patient CMP did not show any acute abnormalities.  Patient's urinalysis was within normal limits.  She had 2 negative troponins.  Pregnancy test was negative.  Patient's EKG did not show any abnormalities.  Patient had an EKG that was a ventricular rate of 69 bpm with sinus rhythm with a AK of 132 QT/QTc of 373/400 ms pacifically there was no prolonged QT in relation to her psychiatric medications.  Patient was instructed to follow-up with her psychiatric provider for referral and continuing to take her medications.  She was also encouraged return here immediately with any worsening symptoms. [CJ]      ED Course User Index  [CJ] Tacho Marley PA-C         Diagnoses as of 05/28/24 0033   Dizziness   Palpitations       Procedures   Procedures    Diagnosis "     1. Dizziness    2. Palpitations        Disposition   Discharged    ED Prescriptions    None         Disclaimer: This note was dictated by speech recognition. Minor errors in transcription may be present. Please call if questions.       Tacho Marley PA-C  05/28/24 0034

## 2024-05-28 NOTE — ED TRIAGE NOTES
"Pt states her psych meds have been on a \"trial and error\" and her DR has been switching things around.  Pt is concerned the meds have been making her have FLS.  "

## 2024-05-28 NOTE — TELEPHONE ENCOUNTER
Returned patient call. Patient is concerned something else more may be going on. Patient states she last took a fluconazole on Saturday to help treat a yeast infection. She was in the ER last night for dizziness and flu-like symptoms. Per patient it was chalked up to side effects of medications. She recently started Prozac and is going to prioritize her mental health medications over repeating a fluconazole. Patient is now experiencing symptoms of brown colored discharge (not thick like before) and spotting as well as some cramps. We reviewed her LMP. She had a natural cycle after her IUD was taken out that lasted 3-4 days and then she started her OCP. She has been taking her OCP regularly. This is the week prior to her period. She is supposed to start her period next week per her OCP.   Patient is scheduled to see Suri next Friday 6/7/24 and is checking to see if she needs to be be evaluated sooner. Will have Suri review and advise. Patient call if symptoms change or worsen.

## 2024-05-28 NOTE — DISCHARGE INSTRUCTIONS
Please follow-up with your primary care provider 1 to 2 days, or return to the emergency department immediately with any worsening symptoms.    If any medications were prescribed please take them as instructed.

## 2024-05-29 NOTE — TELEPHONE ENCOUNTER
Patient was informed of the following and was ok with waiting for her upcoming appointment with Suri. Will contact the office if anything changes.

## 2024-06-03 ENCOUNTER — TELEPHONE (OUTPATIENT)
Dept: CARDIOLOGY | Facility: CLINIC | Age: 29
End: 2024-06-03

## 2024-06-03 ENCOUNTER — TELEPHONE (OUTPATIENT)
Dept: OBSTETRICS AND GYNECOLOGY | Facility: CLINIC | Age: 29
End: 2024-06-03

## 2024-06-03 NOTE — TELEPHONE ENCOUNTER
Patient states she was to call if issues on fluconazole. Pt would like a call back. She was on SSRI through her psychiatrist due to change in medications she started cramping/heavy bleeding/clots last week. The psychiatrist took her off of the medication they had  her on because her serotonin levels were good.    Please advise.

## 2024-06-03 NOTE — TELEPHONE ENCOUNTER
Spoke to patient- She does have an appointment with Suri on 06/07/2024. Been taking OCP x 1 month   She wanted to give her an update on herself. Patient was having yeast infection symptoms, treated herself with OTC Monistat. Symptoms continued was planning to take a Fluconazole but was recently prescribed Lexapro and there was an interaction. Patient took 1 Lexapro didn't tolerate. Her doctor advised her to discontinue and start Prozac instead . C/o palpitation diziness. Went to ER advised to stop Prozac after 3 days.  She is having a prolonged menses, unsure if she is having any yeast infection sx at this time. Told Suri would evaluate her at her appointment

## 2024-06-03 NOTE — TELEPHONE ENCOUNTER
6/3/24  1605  Called patient where patient consulted with her pharmacist and another provider  and it was thought she had a serotonin syndrome over the weekend with blood pressures of 190/119.    Patient reports she has stopped the Prozac and is not interested in taking any more pills as this time.    Instructed patient to monitor blood pressure and to seek medical attention for chest pain, headaches and to inform if she changes her mind of using a beta blocker.    Patient verbalized understanding.        ----- Message from Marcelina Talbert sent at 5/31/2024  3:54 PM EDT -----  Regarding: Patient Questions.  Pt taking   Prozac and s having high blood pressures. Wants to know when to mch or when to high is to high.  Please call her at  [phone].

## 2024-06-07 ENCOUNTER — OFFICE VISIT (OUTPATIENT)
Dept: OBSTETRICS AND GYNECOLOGY | Facility: CLINIC | Age: 29
End: 2024-06-07

## 2024-06-07 VITALS — WEIGHT: 153 LBS | SYSTOLIC BLOOD PRESSURE: 120 MMHG | DIASTOLIC BLOOD PRESSURE: 80 MMHG | BODY MASS INDEX: 27.98 KG/M2

## 2024-06-07 DIAGNOSIS — Z01.419 ENCOUNTER FOR WELL WOMAN EXAM WITH ROUTINE GYNECOLOGICAL EXAM: Primary | ICD-10-CM

## 2024-06-07 PROCEDURE — 99395 PREV VISIT EST AGE 18-39: CPT | Performed by: NURSE PRACTITIONER

## 2024-06-07 NOTE — PROGRESS NOTES
HPI:   Kiera Vuong is a 28 y.o. who presents today for her annual gynecologic exam with complaints    She has the following concerns; has multiple concerns. She continues to have some discharge. Has been using various creams and has taken diflucan as needed. She started Lexapro per her psych NP and had a bad reaction to it. She stopped it, then started Prozac. She was seen in the ER for high blood pressure, c/o dizziness. She had a normal EKG, no signs of prolonged QT. She spoke with her pharmacist who was concerned that she might have been having serotonin syndrome.   She is here for her annual exam toady. PAP is up to date.   She is happy with her ocp; feels it is working well to manage her periods. Denies any ACHES symptoms.     GYN HISTORY:  Periods are regular every 28-30 days, lasting 4 days.   Dysmenorrhea:none. Cyclic symptoms include none.   No intermenstrual bleeding, spotting, or discharge.    Current contraception: OCP (estrogen/progesterone)      Requests STD testing: no     PAP History   Last pap:   2022 Normal HPV Not done  History of abnormal pap: no    Health Screening  Family history of breast, uterine, ovarian or colon cancer: no         The patient feels safe at home.         Review of Systems:   Constitutional: no fever and no chills.  Cardiovascular: no chest pain.   Respiratory: no shortness of breath.   Gastrointestinal: no nausea, no abdominal pain and no constipation  Genitourinary: no dysuria, no urinary incontinence, no vaginal dryness, no pelvic pain and no vaginal discharge.   Neurological: no headache.  Psychiatric: no anxiety and no depression.              Objective         /80   Wt 69.4 kg (153 lb)   LMP 05/28/2024   BMI 27.98 kg/m²         Physical Exam:   Constitutional: Alert and in no acute distress. Well developed, well nourished.      Neck: No neck asymmetry. Supple. Thyroid not enlarged and there were no palpable thyroid nodules.      Cardiovascular:  Heart rate and rhythm were normal, normal S1 and S2, no gallops, and no murmurs.      Pulmonary: No respiratory distress. Clear bilateral breath sounds.      Chest: Breasts: Normal appearance, no nipple discharge and no skin changes. Palpation of breasts and axillae: No palpable mass and no axillary lymphadenopathy.      Abdomen: Soft nontender; no abdominal mass palpated. Normal bowel sounds. No organomegaly.      Genitourinary:   - External genitalia: Normal.   - Palpation of lymph nodes in groin: No inguinal lymphadenopathy.   - Bartholin's Urethral and Skenes Glands: Normal.   - Urethra: Normal.    -Bladder: Normal on palpation.   - Vagina: Normal.   - Cervix: Normal.   - Uterus: Normal. Right Adnexa/parametria: Normal. Left Adnexa/parametria: Normal.   - Perianal Area: Normal.      Skin: Normal skin color and pigmentation, normal skin turgor, and no rash     Psychiatric: Alert and oriented x 3. Affect normal to patient baseline. Mood: Appropriate.            Assessment/Plan       Diagnoses and all orders for this visit:  Encounter for well woman exam with routine gynecological exam  Here for well woman exam. She continues to struggle with depression and anxiety. She feels that her current counselor is not a good fit and she would like a referral to a  provider. She denies any suicidal ideation, has good support at home. Her mother and her partner have been very supportive of her. Reviewed use of 988 crisis line, return to the emergency room with any worsening symptoms or suicidal ideation.   PAP is up to date. She will continue her ocp. She will reach out to the office with any concerns/ worsening symptoms. She will follow-up as needed.         Suri Hardin, NATHANAEL-CNP

## 2024-06-11 ENCOUNTER — PATIENT MESSAGE (OUTPATIENT)
Dept: OBSTETRICS AND GYNECOLOGY | Facility: CLINIC | Age: 29
End: 2024-06-11

## 2024-06-11 DIAGNOSIS — F32.A ANXIETY AND DEPRESSION: Primary | ICD-10-CM

## 2024-06-11 DIAGNOSIS — F41.9 ANXIETY AND DEPRESSION: Primary | ICD-10-CM

## 2024-06-21 PROBLEM — F41.9 ANXIETY AND DEPRESSION: Status: ACTIVE | Noted: 2024-06-21

## 2024-06-21 PROBLEM — Z01.419 ENCOUNTER FOR WELL WOMAN EXAM WITH ROUTINE GYNECOLOGICAL EXAM: Status: ACTIVE | Noted: 2024-06-21

## 2024-06-21 PROBLEM — F32.A ANXIETY AND DEPRESSION: Status: ACTIVE | Noted: 2024-06-21

## 2024-06-21 PROBLEM — Z30.431 ENCOUNTER FOR ROUTINE CHECKING OF INTRAUTERINE CONTRACEPTIVE DEVICE (IUD): Status: RESOLVED | Noted: 2023-10-02 | Resolved: 2024-06-21

## 2024-06-21 NOTE — ASSESSMENT & PLAN NOTE
PAP is up to date. Doing well on her ocp. She will continue this. Feels pelvic pain has resolved since having her IUD removed.

## 2024-07-11 ENCOUNTER — PATIENT MESSAGE (OUTPATIENT)
Dept: OBSTETRICS AND GYNECOLOGY | Facility: CLINIC | Age: 29
End: 2024-07-11

## 2024-07-11 ENCOUNTER — TELEPHONE (OUTPATIENT)
Dept: OTHER | Age: 29
End: 2024-07-11

## 2024-07-11 ENCOUNTER — APPOINTMENT (OUTPATIENT)
Dept: BEHAVIORAL HEALTH | Facility: CLINIC | Age: 29
End: 2024-07-11

## 2024-07-11 DIAGNOSIS — F33.2 SEVERE EPISODE OF RECURRENT MAJOR DEPRESSIVE DISORDER, WITHOUT PSYCHOTIC FEATURES (MULTI): ICD-10-CM

## 2024-07-11 DIAGNOSIS — F42.2 MIXED OBSESSIONAL THOUGHTS AND ACTS: ICD-10-CM

## 2024-07-11 DIAGNOSIS — N89.8 VAGINAL DISCHARGE: ICD-10-CM

## 2024-07-11 DIAGNOSIS — F41.1 GAD (GENERALIZED ANXIETY DISORDER): Primary | ICD-10-CM

## 2024-07-11 DIAGNOSIS — F51.04 PSYCHOPHYSIOLOGICAL INSOMNIA: ICD-10-CM

## 2024-07-11 DIAGNOSIS — F32.A ANXIETY AND DEPRESSION: ICD-10-CM

## 2024-07-11 DIAGNOSIS — F41.9 ANXIETY AND DEPRESSION: ICD-10-CM

## 2024-07-11 PROCEDURE — 99205 OFFICE O/P NEW HI 60 MIN: CPT | Performed by: NURSE PRACTITIONER

## 2024-07-11 PROCEDURE — 90792 PSYCH DIAG EVAL W/MED SRVCS: CPT | Performed by: NURSE PRACTITIONER

## 2024-07-11 PROCEDURE — 99417 PROLNG OP E/M EACH 15 MIN: CPT | Performed by: NURSE PRACTITIONER

## 2024-07-11 RX ORDER — NAPROXEN 500 MG/1
500 TABLET ORAL
COMMUNITY

## 2024-07-11 RX ORDER — PHENYLPROPANOLAMINE/CLEMASTINE 75-1.34MG
200 TABLET, EXTENDED RELEASE ORAL EVERY 6 HOURS PRN
COMMUNITY

## 2024-07-11 RX ORDER — CLOTRIMAZOLE AND BETAMETHASONE DIPROPIONATE 10; .64 MG/G; MG/G
CREAM TOPICAL 2 TIMES DAILY
Qty: 45 G | Refills: 1 | Status: SHIPPED | OUTPATIENT
Start: 2024-07-11

## 2024-07-11 RX ORDER — AMITRIPTYLINE HYDROCHLORIDE 10 MG/1
20 TABLET, FILM COATED ORAL NIGHTLY
Qty: 120 TABLET | Refills: 0 | Status: SHIPPED | OUTPATIENT
Start: 2024-07-11 | End: 2024-09-09

## 2024-07-11 RX ORDER — PROPRANOLOL HYDROCHLORIDE 10 MG/1
30 TABLET ORAL AS NEEDED
Qty: 270 TABLET | Refills: 0 | Status: SHIPPED | OUTPATIENT
Start: 2024-07-11 | End: 2024-09-09

## 2024-07-11 ASSESSMENT — PATIENT HEALTH QUESTIONNAIRE - PHQ9
4. FEELING TIRED OR HAVING LITTLE ENERGY: MORE THAN HALF THE DAYS
2. FEELING DOWN, DEPRESSED OR HOPELESS: NEARLY EVERY DAY
7. TROUBLE CONCENTRATING ON THINGS, SUCH AS READING THE NEWSPAPER OR WATCHING TELEVISION: NEARLY EVERY DAY
10. IF YOU CHECKED OFF ANY PROBLEMS, HOW DIFFICULT HAVE THESE PROBLEMS MADE IT FOR YOU TO DO YOUR WORK, TAKE CARE OF THINGS AT HOME, OR GET ALONG WITH OTHER PEOPLE: VERY DIFFICULT
3. TROUBLE FALLING OR STAYING ASLEEP OR SLEEPING TOO MUCH: NEARLY EVERY DAY
1. LITTLE INTEREST OR PLEASURE IN DOING THINGS: MORE THAN HALF THE DAYS
8. MOVING OR SPEAKING SO SLOWLY THAT OTHER PEOPLE COULD HAVE NOTICED. OR THE OPPOSITE, BEING SO FIGETY OR RESTLESS THAT YOU HAVE BEEN MOVING AROUND A LOT MORE THAN USUAL: NOT AT ALL
6. FEELING BAD ABOUT YOURSELF - OR THAT YOU ARE A FAILURE OR HAVE LET YOURSELF OR YOUR FAMILY DOWN: NEARLY EVERY DAY
5. POOR APPETITE OR OVEREATING: NEARLY EVERY DAY
9. THOUGHTS THAT YOU WOULD BE BETTER OFF DEAD, OR OF HURTING YOURSELF: SEVERAL DAYS

## 2024-07-11 ASSESSMENT — ANXIETY QUESTIONNAIRES
7. FEELING AFRAID AS IF SOMETHING AWFUL MIGHT HAPPEN: NEARLY EVERY DAY
2. NOT BEING ABLE TO STOP OR CONTROL WORRYING: NEARLY EVERY DAY
1. FEELING NERVOUS, ANXIOUS, OR ON EDGE: NEARLY EVERY DAY
GAD7 TOTAL SCORE: 19
4. TROUBLE RELAXING: NEARLY EVERY DAY
3. WORRYING TOO MUCH ABOUT DIFFERENT THINGS: NEARLY EVERY DAY
6. BECOMING EASILY ANNOYED OR IRRITABLE: NEARLY EVERY DAY
5. BEING SO RESTLESS THAT IT IS HARD TO SIT STILL: SEVERAL DAYS
IF YOU CHECKED OFF ANY PROBLEMS ON THIS QUESTIONNAIRE, HOW DIFFICULT HAVE THESE PROBLEMS MADE IT FOR YOU TO DO YOUR WORK, TAKE CARE OF THINGS AT HOME, OR GET ALONG WITH OTHER PEOPLE: VERY DIFFICULT

## 2024-07-11 NOTE — TELEPHONE ENCOUNTER
Pharmacy calling to confirm that up to 3 tablets of propranolol can be taken per day ( but no more)    Please confirm
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
## 2024-07-14 PROBLEM — F41.1 GAD (GENERALIZED ANXIETY DISORDER): Status: ACTIVE | Noted: 2024-07-14

## 2024-07-14 PROBLEM — F33.2 SEVERE EPISODE OF RECURRENT MAJOR DEPRESSIVE DISORDER, WITHOUT PSYCHOTIC FEATURES (MULTI): Status: ACTIVE | Noted: 2024-07-14

## 2024-07-14 PROBLEM — F51.04 PSYCHOPHYSIOLOGICAL INSOMNIA: Status: ACTIVE | Noted: 2024-07-14

## 2024-07-14 PROBLEM — F42.2 MIXED OBSESSIONAL THOUGHTS AND ACTS: Status: ACTIVE | Noted: 2024-07-14

## 2024-07-14 ASSESSMENT — ENCOUNTER SYMPTOMS
CONSTITUTIONAL NEGATIVE: 1
NERVOUS/ANXIOUS: 1
DYSPHORIC MOOD: 1
SLEEP DISTURBANCE: 1
DECREASED CONCENTRATION: 1

## 2024-08-15 ENCOUNTER — APPOINTMENT (OUTPATIENT)
Dept: BEHAVIORAL HEALTH | Facility: CLINIC | Age: 29
End: 2024-08-15

## 2024-08-16 ENCOUNTER — TELEMEDICINE (OUTPATIENT)
Dept: BEHAVIORAL HEALTH | Facility: CLINIC | Age: 29
End: 2024-08-16

## 2024-08-16 DIAGNOSIS — F41.1 GAD (GENERALIZED ANXIETY DISORDER): ICD-10-CM

## 2024-08-16 DIAGNOSIS — F51.04 PSYCHOPHYSIOLOGICAL INSOMNIA: ICD-10-CM

## 2024-08-16 DIAGNOSIS — F42.2 MIXED OBSESSIONAL THOUGHTS AND ACTS: Primary | ICD-10-CM

## 2024-08-16 DIAGNOSIS — F33.2 SEVERE EPISODE OF RECURRENT MAJOR DEPRESSIVE DISORDER, WITHOUT PSYCHOTIC FEATURES (MULTI): ICD-10-CM

## 2024-08-16 PROCEDURE — 99214 OFFICE O/P EST MOD 30 MIN: CPT | Performed by: NURSE PRACTITIONER

## 2024-08-16 RX ORDER — FLUVOXAMINE MALEATE 25 MG/1
25 TABLET ORAL NIGHTLY
Qty: 60 TABLET | Refills: 0 | Status: SHIPPED | OUTPATIENT
Start: 2024-08-16 | End: 2024-10-15

## 2024-08-16 ASSESSMENT — PATIENT HEALTH QUESTIONNAIRE - PHQ9
9. THOUGHTS THAT YOU WOULD BE BETTER OFF DEAD, OR OF HURTING YOURSELF: SEVERAL DAYS
10. IF YOU CHECKED OFF ANY PROBLEMS, HOW DIFFICULT HAVE THESE PROBLEMS MADE IT FOR YOU TO DO YOUR WORK, TAKE CARE OF THINGS AT HOME, OR GET ALONG WITH OTHER PEOPLE: VERY DIFFICULT
1. LITTLE INTEREST OR PLEASURE IN DOING THINGS: SEVERAL DAYS
8. MOVING OR SPEAKING SO SLOWLY THAT OTHER PEOPLE COULD HAVE NOTICED. OR THE OPPOSITE, BEING SO FIGETY OR RESTLESS THAT YOU HAVE BEEN MOVING AROUND A LOT MORE THAN USUAL: NOT AT ALL
3. TROUBLE FALLING OR STAYING ASLEEP OR SLEEPING TOO MUCH: MORE THAN HALF THE DAYS
4. FEELING TIRED OR HAVING LITTLE ENERGY: MORE THAN HALF THE DAYS
7. TROUBLE CONCENTRATING ON THINGS, SUCH AS READING THE NEWSPAPER OR WATCHING TELEVISION: SEVERAL DAYS
2. FEELING DOWN, DEPRESSED OR HOPELESS: SEVERAL DAYS
5. POOR APPETITE OR OVEREATING: SEVERAL DAYS
6. FEELING BAD ABOUT YOURSELF - OR THAT YOU ARE A FAILURE OR HAVE LET YOURSELF OR YOUR FAMILY DOWN: MORE THAN HALF THE DAYS

## 2024-08-16 ASSESSMENT — ANXIETY QUESTIONNAIRES
3. WORRYING TOO MUCH ABOUT DIFFERENT THINGS: NEARLY EVERY DAY
7. FEELING AFRAID AS IF SOMETHING AWFUL MIGHT HAPPEN: NEARLY EVERY DAY
GAD7 TOTAL SCORE: 14
IF YOU CHECKED OFF ANY PROBLEMS ON THIS QUESTIONNAIRE, HOW DIFFICULT HAVE THESE PROBLEMS MADE IT FOR YOU TO DO YOUR WORK, TAKE CARE OF THINGS AT HOME, OR GET ALONG WITH OTHER PEOPLE: EXTREMELY DIFFICULT
6. BECOMING EASILY ANNOYED OR IRRITABLE: SEVERAL DAYS
5. BEING SO RESTLESS THAT IT IS HARD TO SIT STILL: NOT AT ALL
2. NOT BEING ABLE TO STOP OR CONTROL WORRYING: NEARLY EVERY DAY
4. TROUBLE RELAXING: MORE THAN HALF THE DAYS
1. FEELING NERVOUS, ANXIOUS, OR ON EDGE: MORE THAN HALF THE DAYS
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
## 2024-08-18 PROBLEM — F41.9 ANXIETY AND DEPRESSION: Status: RESOLVED | Noted: 2024-06-21 | Resolved: 2024-08-18

## 2024-08-18 PROBLEM — F32.A ANXIETY AND DEPRESSION: Status: RESOLVED | Noted: 2024-06-21 | Resolved: 2024-08-18

## 2024-08-18 ASSESSMENT — ENCOUNTER SYMPTOMS
DYSPHORIC MOOD: 1
NERVOUS/ANXIOUS: 1
CONSTITUTIONAL NEGATIVE: 1

## 2024-10-16 ENCOUNTER — APPOINTMENT (OUTPATIENT)
Dept: BEHAVIORAL HEALTH | Facility: CLINIC | Age: 29
End: 2024-10-16

## 2024-10-16 DIAGNOSIS — F42.2 MIXED OBSESSIONAL THOUGHTS AND ACTS: Primary | ICD-10-CM

## 2024-10-16 DIAGNOSIS — F41.1 GAD (GENERALIZED ANXIETY DISORDER): ICD-10-CM

## 2024-10-16 DIAGNOSIS — F33.1 MODERATE EPISODE OF RECURRENT MAJOR DEPRESSIVE DISORDER: ICD-10-CM

## 2024-10-16 DIAGNOSIS — F51.04 PSYCHOPHYSIOLOGICAL INSOMNIA: ICD-10-CM

## 2024-10-16 PROCEDURE — 99214 OFFICE O/P EST MOD 30 MIN: CPT | Performed by: NURSE PRACTITIONER

## 2024-10-16 ASSESSMENT — PATIENT HEALTH QUESTIONNAIRE - PHQ9
8. MOVING OR SPEAKING SO SLOWLY THAT OTHER PEOPLE COULD HAVE NOTICED. OR THE OPPOSITE, BEING SO FIGETY OR RESTLESS THAT YOU HAVE BEEN MOVING AROUND A LOT MORE THAN USUAL: NOT AT ALL
7. TROUBLE CONCENTRATING ON THINGS, SUCH AS READING THE NEWSPAPER OR WATCHING TELEVISION: MORE THAN HALF THE DAYS
4. FEELING TIRED OR HAVING LITTLE ENERGY: SEVERAL DAYS
10. IF YOU CHECKED OFF ANY PROBLEMS, HOW DIFFICULT HAVE THESE PROBLEMS MADE IT FOR YOU TO DO YOUR WORK, TAKE CARE OF THINGS AT HOME, OR GET ALONG WITH OTHER PEOPLE: VERY DIFFICULT
2. FEELING DOWN, DEPRESSED OR HOPELESS: SEVERAL DAYS
9. THOUGHTS THAT YOU WOULD BE BETTER OFF DEAD, OR OF HURTING YOURSELF: SEVERAL DAYS
3. TROUBLE FALLING OR STAYING ASLEEP OR SLEEPING TOO MUCH: MORE THAN HALF THE DAYS
6. FEELING BAD ABOUT YOURSELF - OR THAT YOU ARE A FAILURE OR HAVE LET YOURSELF OR YOUR FAMILY DOWN: MORE THAN HALF THE DAYS
1. LITTLE INTEREST OR PLEASURE IN DOING THINGS: SEVERAL DAYS
5. POOR APPETITE OR OVEREATING: SEVERAL DAYS

## 2024-10-16 ASSESSMENT — ANXIETY QUESTIONNAIRES
3. WORRYING TOO MUCH ABOUT DIFFERENT THINGS: NEARLY EVERY DAY
7. FEELING AFRAID AS IF SOMETHING AWFUL MIGHT HAPPEN: MORE THAN HALF THE DAYS
GAD7 TOTAL SCORE: 12
6. BECOMING EASILY ANNOYED OR IRRITABLE: MORE THAN HALF THE DAYS
2. NOT BEING ABLE TO STOP OR CONTROL WORRYING: MORE THAN HALF THE DAYS
IF YOU CHECKED OFF ANY PROBLEMS ON THIS QUESTIONNAIRE, HOW DIFFICULT HAVE THESE PROBLEMS MADE IT FOR YOU TO DO YOUR WORK, TAKE CARE OF THINGS AT HOME, OR GET ALONG WITH OTHER PEOPLE: SOMEWHAT DIFFICULT
4. TROUBLE RELAXING: SEVERAL DAYS
1. FEELING NERVOUS, ANXIOUS, OR ON EDGE: MORE THAN HALF THE DAYS
5. BEING SO RESTLESS THAT IT IS HARD TO SIT STILL: NOT AT ALL

## 2024-10-16 ASSESSMENT — ENCOUNTER SYMPTOMS
NERVOUS/ANXIOUS: 1
DYSPHORIC MOOD: 1

## 2024-10-16 NOTE — PROGRESS NOTES
"Adult Ambulatory Psychiatry Progress Note      Assessment/Plan     Impression:  Kiera Mata is a 28 y.o. 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 I am just doing this new job, and catching up on things with the house and it is getting overwhelming with the house stuff. I am still having trouble organizing tasks in my head so I have to delegate them to my . I am just struggling with getting things done that I see in my head, and they are only going to be half done.\"    Plan: Patient was cooperative, less reserved but still nervous. OCD continues to be the biggest concern for her, but is still doing therapy, and slowly going up on dose of Luvox. Reminded patient to power through side effects and unless they are severe enough that they are resulting in her going to the hospital, the messages weekly over MyChart are not necessary, as the side effects of dose changes will fade away with time. Encouraged patient to go up on dose of Luvox over the next 5 days fully to 100mg. Can continue Propranolol for anxiety management. Encouraged to have therapy focus on trauma history.    Medication: Increases taking Luvox 50mg  to 75mg for 5 days then 100mg, at bedtime  thereafter. Can continue taking Propranolol now up to 40-50mg as needed for physical anxiety management.    Reviewed r/b/a, possible side effects of the medication. Client is aware about the benefit outweighs the risk.     Diagnoses and all orders for this visit:  Mixed obsessional thoughts and acts  GRETA (generalized anxiety disorder)  -     propranolol (Inderal) 10 mg tablet; Take 3 tablets (30 mg) by mouth if needed (anxiety management).  Psychophysiological insomnia  Moderate episode of recurrent major depressive disorder        Therapy: none    Other: n/a    Patient is reminded that if there is SI to call [phone] and get themselves to the closest ED for evaluation, otherwise contact me for other " "questions/concerns.     Subjective   HPI:  HPI:  \"Both the patient, and family and caregivers and guardians as appropriate, were informed of the current need to conduct treatment via telephone. I have confirmed the patient's identity via the following (minimum of three) acceptable identifiers as per  Policy PH-9: , address on file, self-pay.\"     Virtual or Telephone Consent     An interactive audio and video telecommunication system which permits real time communications between the patient (at the originating site) and provider (at the distant site) was utilized to provide this telehealth service.   Verbal consent was requested and obtained from Kiera Vuong on this date, 10/16/24 for a telehealth visit.      Present Illness - OCD, anxiety    Characteristics/Recent psychiatric symptoms (pertinent positives and negatives) - reports feeling overwhelmed by doing her new job, and then having to do her house chores/tasks and that itself has become too much for her to deal with. Reveals there were bats in the attic from a year ago, so all items from the attic is scattered throughout her house, and that is triggering her OCD, to the point of what was once an orderly/clean home, is now so chaotic, 'that I shut down. He (her ) said to me that as long as we have food to eat and a place to sleep, and this issue will eventually resolve itself, things will be fine, but for me, I just can't put things away or do my hobbies and things that I want to do because I can't find half the things I need to get to, so it is too much. I do my work, and then I come home and it is fine, but I just want to sit down and do nothing.' Reveals likes to take the Luvox dose increases slowly, and is now ready to go up to 75mg as the side effects she experiences when she moves up a dose - brain fog, head feels 'swimmy'/moving her head too fast, then her eyes were not tracking as fast (brain not receiving the information at the same " time), but is ready to move up. Only notices unprovoked anxiety before bedtime, that has nothing to do with the medication s/e and would experience intense dreams/nightmares when going up on the dose of the Luvox, but those would dissipate over time. (Did not have those s/e when on the Elavil). Reports not having taken the Propranolol and the Methylphenidate at all as she wants to focus on just the Luvox for her OCD and anxiety. Reports work is going well and just experiences normal work anxiety and wants to pay attention to her work quality and making sure she works within the capacity of her role, doing admin duties (instead of accidentally clicking on an 's job/role - which she feels is a new OCD behaviors which can trigger her anxiety). Admits to short term recall memory issues especially at work. Reports sleep is more problematic with falling asleep lately (routine of staying off of her phone, not wanting to touch items nearby) that can leave her awake for an hour, interrupted by nightmares at times, and then oversleeps (often through her alarm), and will have to skip steps to get to work on time. Uncertain of how much time she is sleeping lately but is waking up tired. Reports finding herself working with her therapist on trying to cut down and/or find ways to block and/or desensitize washing her hands (still her number 1 issue with her OCD) and other similar sensory issues related to her fingers and will be wearing a glove to bed as a barrier. As she has still not started her Methylphenidate for her ADHD, tasks are still slowed down, and has to push off tasks or delegate tasks to her . She is working, with her therapist, on continuing to monitor herself with not acting on compulsions but is feeling a return in the increased need to do so, as of late. Her therapist still wants to hold off doing any CBT or exposure therapy until the right dose of Luvox is achieved to stabilize her  symptoms. Still admits to magical thinking OCD and when feeling stressed and continues to admit to the delusional, irrational thoughts, and wishes it was just her germaphobia. Reports overall anxiety and depression have gotten better. Being at her new job, she feels less burned out, as people are nice, people are respecting boundaries - not pushing her to do more than she has to do, and not overwhelming her with more work than she is capable of doing. Reports energy levels are stable but mental fatigue will hit her at work, after she stares at her computer screen too long and has to distract herself, walk around the office. On weekends, she has more energy levels, and stable mental/physical fatigue. After work, she may have reserve energy levels that will keep her active until midnight and may end up cleaning a part of the house, but once the Luvox kicks in, she will get sleepy and lays down. Appetite is slowly getting better as she is joining her  in a new diet. Reports a slight drop in racing thoughts, but endorses no change with obsessive and ruminating thoughts.     Onset/timeframe - 4 weeks   Type -  OCD, GRETA  Duration - daily  Aggravating and/or relieving factors/triggers - Luvox is helping, but still hyper-focused with dose changes and perceived side effects  Treatment and treatment changes (new meds, dosage increases or decreases, med compliance, therapy frequency, etc.) (Past and Recent) - Luvox 50mg HS, Propranolol 40-50mg PRN. Still seeing therapist. Christie King     Issues: Denies SI (passive)/HI/AVH currently.           Review of Systems   Psychiatric/Behavioral:  Positive for behavioral problems, decreased concentration and dysphoric mood. The patient is nervous/anxious.    All other systems reviewed and are negative.      OARRS:  Cooper Bonilla, APRN-CNP on 10/20/2024  3:25 PM  I have personally reviewed the OARRS report for Kiera Mata. I have considered the risks of abuse,  dependence, addiction and diversion    Is the patient prescribed a combination of a benzodiazepine and opioid?  No    Last Urine Drug Screen / ordered today: No  No results found for this or any previous visit (from the past 8760 hours).  N/A    Clinical rationale for not completing a Urine Drug Screen: N/A at this time      Controlled Substance Agreement:  Date of the Last Agreement: will be done when Concerta is started in the near future  Reviewed Controlled Substance Agreement including but not limited to the benefits, risks, and alternatives to treatment with a Controlled Substance medication(s).    Stimulants:   What is the patient's goal of therapy? Stable ADHD  Is this being achieved with current treatment? no    Activities of Daily Living:   Is your overall impression that this patient is benefiting (symptom reduction outweighs side effects) from stimulant therapy? Holding off medication until after OCD, anxiety, depression is stabilized    1. Physical Functioning: Same  2. Family Relationship: Same  3. Social Relationship: Same  4. Mood: Same  5. Sleep Patterns: Better  6. Overall Function: Same      Objective   Mental Status Exam:  General Appearance: Well groomed, appropriate eye contact  Attitude/Behavior: Cooperative, Distracted  Motor: No psychomotor agitation or retardation, no tremor or other abnormal movements  Speech: Normal rate, volume, prosody  Gait/Station: Other:(comment) (sitting in living room at home, on break from work, over virtual connection)  Mood: 'ok, managing'  Affect: Dysphoric, constricted but reactive, Anxious, Congruent with mood and topic of conversation  Thought Process: Perseverative, Flight of ideas (obsessive, racing)  Thought Associations: No loosening of associations  Thought Content: Other: (comment) (still struggling with her irrational thoughts, tied to her OCD and is making extra effort to monitor the changes and is hyper focused on her titrating up on Luvox with any  s/e on her mind and body, causing her to be hesitant)  Perception: No perceptual abnormalities noted  Sensorium: Alert and oriented to person, place, time and situation  Insight: Fair  Judgement: Fair  Cognition: Cognitively intact to conversational testing with respect to attention, orientation, fund of knowledge, recent and remote memory, and language  Testing: N/A    GRETA-7/PHQ-9 scores reviewed: 12, 11 compared to 14, 11 reflecting mild improvement in anxiety but no change in depression.    Current Medications:  Current Outpatient Medications on File Prior to Visit   Medication Sig Dispense Refill    clotrimazole-betamethasone (Lotrisone) cream Apply topically 2 times a day. 45 g 1    fLuvoxaMINE (Luvox) 25 mg tablet Take 4 tablets (100 mg) by mouth once daily at bedtime. Start taking 2 tablets for 5 nights, then increase to 3 tablets for 5 nights, then 4 tablets for remainder of script 240 tablet 0    ibuprofen (Motrin) capsule Take 1 capsule (200 mg) by mouth every 6 hours if needed (pain).      methylphenidate ER (CONCERTA) 27 mg oral extended release tablet Take 1 tablet (27 mg) by mouth once daily in the morning. Do not crush, chew, or split.      naproxen (Naprosyn) 500 mg tablet Take 1 tablet (500 mg) by mouth 2 times daily (morning and late afternoon).      norethindrone-e.estradioL-iron (Junel FE 1/20) 1 mg-20 mcg (21)/75 mg (7) tablet Take 1 tablet by mouth once daily. 28 tablet 11    [DISCONTINUED] fluconazole (Diflucan) 150 mg tablet TAKE 1 TABLET BY MOUTH AS ONE DOSE (Patient taking differently: Take 1 tablet (150 mg) by mouth if needed.) 2 tablet 2    [DISCONTINUED] propranolol (Inderal) 10 mg tablet Take 3 tablets (30 mg) by mouth if needed (anxiety management). 270 tablet 0     No current facility-administered medications on file prior to visit.       Lab Review:   not applicable      Time Spent:    Prep time: 1 min.  Direct patient time: 29 min.  Documentation time: 8 min.  Total time: 38  min.    Next Appointment:  Follow up in 6 weeks (on 11/27/2024).

## 2024-10-20 RX ORDER — PROPRANOLOL HYDROCHLORIDE 10 MG/1
30 TABLET ORAL AS NEEDED
Qty: 270 TABLET | Refills: 0 | Status: SHIPPED | OUTPATIENT
Start: 2024-10-20 | End: 2024-12-19

## 2024-10-20 ASSESSMENT — ENCOUNTER SYMPTOMS: DECREASED CONCENTRATION: 1

## 2025-05-25 NOTE — PROGRESS NOTES
[facility]    Patient Name: Kiera Mata  Patient : 1995    Scribe Attestation  By signing my name below, Carrie ALMEIDA, Katherineibcarli attest that this documentation has been prepared under the direction and in the presence of Partha Meneses DO.    Physician Attestation  Partha ALMEIDA DO, personally performed the services described in the documentation as scribed by Carrie Piedra in my presence, and confirm it is both accurate and complete.    Reason for visit:  This is a 29-year-old female here for follow-up regarding previously reported palpitations/shortness of breath/dizziness all of which were reportedly improved on follow-up visit and dyslipidemia.        HPI:  This is a 29-year-old female here for follow-up regarding previously reported palpitations/shortness of breath/dizziness all of which were reportedly improved on follow-up visit and dyslipidemia.  Patient was last evaluated in May 2025 and at that time I discussed the possibility of beta-blocker therapy for the palpitations which the patient had declined.  Patient was otherwise asked to follow-up in 1 year.  The patient was seen in the emergency department several days later secondary to dizziness.  The patient was subsequently started on metoprolol therapy by her PCP.  CMP done 2025 showed normal serum sodium and potassium with a serum creatinine of 0.79, normal ALT/AST, hemoglobin A1c was 5.0%, TSH was 2.35, CBC showed a hemoglobin of 15.5.  Lipid panel done 2025 showed an LDL cholesterol of 113 and triglycerides of 95 not currently on any lipid-lowering medical therapy.  MRI of the brain done in 2025 showed no brain parenchymal signal abnormality or abnormal intracranial mass lesion noted. ECG done today showed sinus rhythm with a heart rate of 67 bpm.  The patient reports that she has been feeling generally well from the cardiac standpoint. She denies any new chest pain,  shortness of breath, palpitations and lightheadedness. She states that she is currently not on any antihypertensive medication. During my exam, she was resting comfortably on the exam table.             Assessment/Plan:   Palpitations/shortness of breath/dizziness  The previously reported palpitations, shortness of breath and dizziness had improved.  ECG done today showed sinus rhythm with a heart rate of 67 bpm.   She denies chest pain, palpitations or lightheadedness.   Echocardiogram done 4/25/2024 showed normal left ventricular systolic function with an ejection fraction of 65%, normal right ventricular systolic function, and no significant valve abnormalities.   Recent lab works as noted in the HPI.   Patient should follow general recommendations including avoiding excessive alcohol intake, avoiding excessive caffeine intake, staying well-hydrated, getting an appropriate amount of sleep.  Follow up in 1 year and sooner if necessary.       2. Dyslipidemia  Lipid panel done 4/9/2025 showed an LDL cholesterol of 113 and triglycerides of 95 not currently on any lipid-lowering medical therapy.  Please see lifestyle recommendations below.    3. Obesity  Please see lifestyle recommendations below.    4.  Elevated blood pressure  The patient has a history of elevated blood pressure and was previously started on antihypertensive medical therapy by her PCP.  Patient states that she has not been taking any antihypertensive medications and blood pressures have been well-controlled at home.  I provided her with nonpharmacologic recommendations to help lower blood pressure including following a low-sodium heart healthy diet and routine exercise.  Patient will continue to monitor blood pressure at home.         Order:   Follow-up in 1 year.    Lifestyle Recommendations  I recommend a whole-food plant-based diet, an eating pattern that encourages the consumption of unrefined plant foods (such as fruits, vegetables, tubers,  whole grains, legumes, nuts and seeds) and discourages meats, dairy products, eggs and processed foods.     The AHA/ACC recommends that the patient consume a dietary pattern that emphasizes intake of vegetables, fruits, and whole grains; includes low-fat dairy products, poultry, fish, legumes, non-tropical vegetable oils, and nuts; and limits intake of sodium, sweets, sugar-sweetened beverages, and red meats.  Adapt this dietary pattern to appropriate calorie requirements (a 500-750 kcal/day deficit to loose weight), personal and cultural food preferences, and nutrition therapy for other medical conditions (including diabetes).  Achieve this pattern by following plans such as the [place] Mediterranean, DASH dietary pattern, or AHA diet.     Engage in 2 hours and 30 minutes per week of moderate-intensity physical activity, or 1 hour and 15 minutes (75 minutes) per week of vigorous-intensity aerobic physical activity, or an equivalent combination of moderate and vigorous-intensity aerobic physical activity. Aerobic activity should be performed in episodes of at least 10 minutes preferably spread throughout the week.     Adhering to a heart healthy diet, regular exercise habits, avoidance of tobacco products, and maintenance of a healthy weight are crucial components of their heart disease risk reduction.     Any positive review of systems not specifically addressed in the office visit today should be evaluated and treated by the patients primary care physician or in an emergency department if necessary     Patient was notified that results from ordered tests will be called to the patient if it changes current management; it will otherwise be discussed at a future appointment and available [facility]  [facility]     Thank you for allowing me to participate in the care of this patient.        This document was generated using the assistance of voice recognition software. If there are any errors of spelling, grammar, syntax, or  meaning; please feel free to contact me directly for clarification.    Past Medical History:  She has a past medical history of Depression, Interstitial cystitis (chronic) without hematuria, Kidney stone, Personal history of other diseases of the respiratory system, Personal history of other mental and behavioral disorders, Personal history of other specified conditions (01/29/2019), Personal history of urinary (tract) infections (01/29/2019), and Urinary tract infection.    Past Surgical History:  She has a past surgical history that includes Septoplasty (10/04/2018); Rhinoplasty (10/04/2018); Kidney stone surgery; Cystoscopy w/ ureteral stent removal; and Exploratory laparotomy.      Social History:  She reports that she has never smoked. She has been exposed to tobacco smoke. She has never used smokeless tobacco. She reports current alcohol use of about 4.0 standard drinks of alcohol per week. She reports that she does not currently use drugs after having used the following drugs: Marijuana.    Family History:  Family History  Problem Relation Name Age of Onset    Asthma Mother Mom     Kidney nephrosis Father Dad     Depression Father Dad     Heart disease Maternal Grandfather Maternal Grandfather     Kidney disease Maternal Grandfather Maternal Grandfather     Stroke Maternal Grandfather Maternal Grandfather     Diabetes Paternal Grandfather Maternal Grandfather     Arthritis Paternal Grandmother Paternal Grandmother     Atrial fibrillation Paternal Grandmother Paternal Grandmother     Blood Disorder Paternal Grandmother Paternal Grandmother     Heart disease Paternal Grandmother Paternal Grandmother     Alcohol abuse Mother's Brother Uncle     Genetic Testing Mother's Sister Aunt         Allergies:  Bactrim [sulfamethoxazole-trimethoprim], Latex, and Tree and shrub pollen    Outpatient Medications:  Current Outpatient Medications   Medication Instructions    Bacillus coagulans (PROBIOTIC, B. COAGULANS, ORAL)  "Take by mouth.    cephalexin (KEFLEX) 250 mg, oral, 2 times daily    clindamycin (Cleocin T) 1 % lotion Topical, 2 times daily    fLuvoxaMINE (LUVOX) 100 mg, oral, Nightly    ibuprofen (MOTRIN) 200 mg, Every 6 hours PRN    Junel FE 1/20, 28, 1 mg-20 mcg (21)/75 mg (7) tablet 1 tablet, oral, Daily    losartan (COZAAR) 25 mg, oral, Daily    montelukast (SINGULAIR) 10 mg, oral, Nightly    naproxen (NAPROSYN) 500 mg, 2 times daily (morning and late afternoon)    norgestimate-ethinyl estradioL (Tri-Estarylla) 0.18/0.215/0.25 mg-35 mcg (28) tablet 1 tablet, Daily    phenylephrine-acetaminophen-GG 5-325-100 mg tablet 1 tablet, As needed        ROS:  A 14 point review of systems was done and is negative other than as stated in HPI    Vitals:      1/2/2025    10:06 AM 1/11/2025    12:59 PM 1/28/2025     5:13 PM 3/14/2025     8:24 AM 4/9/2025     7:02 AM 5/1/2025     8:07 AM 5/25/2025     5:00 PM   Vitals   Systolic 122 138  152 134 136 132   Diastolic 88 90  105 95 103 98   BP Location Left arm         Heart Rate 73 103  72 80 96 70   Temp 36.2 °C (97.1 °F) 36.7 °C (98 °F)  36.8 °C (98.2 °F)      Resp  16  16   16   Height 1.575 m (5' 2\")    1.575 m (5' 2.01\")     Weight (lb) 160.6  160  165     BMI 29.37 kg/m2  29.26 kg/m2  30.17 kg/m2     BSA (m2) 1.78 m2  1.78 m2  1.81 m2          Physical Exam:   Constitutional: Cooperative, in no acute distress, alert, appears stated age.  Skin: Skin color, texture, turgor normal. No rashes or lesions.  Head: Normocephalic. No masses, lesions, tenderness or abnormalities  Eyes: Extraocular movements are grossly intact.  Mouth and throat: Mucous membranes moist  Neck: Neck supple, no carotid bruits, no JVD  Respiratory: Lungs clear to auscultation, no wheezing or rhonchi, no use of accessory muscles  Chest wall: No scars, normal excursion with respiration  Cardiovascular: Regular rhythm without murmur  Gastrointestinal: Abdomen soft, nontender. Bowel sounds normal. " Obese.  Musculoskeletal: Strength equal in upper extremities  Extremities: No pitting edema  Neurologic: Sensation grossly intact, alert and oriented ×3      Intake/Output:   No intake/output data recorded.    Outpatient Medications  Current Outpatient Medications on File Prior to Visit   Medication Sig Dispense Refill    Bacillus coagulans (PROBIOTIC, B. COAGULANS, ORAL) Take by mouth.      cephalexin (Keflex) 250 mg capsule Take 1 capsule (250 mg) by mouth 2 times a day for 7 days. 14 capsule 0    clindamycin (Cleocin T) 1 % lotion Apply topically 2 times a day. 60 mL 0    fLuvoxaMINE (Luvox) 100 mg tablet Take 1 tablet (100 mg) by mouth once daily at bedtime. 90 tablet 3    ibuprofen (Motrin) capsule Take 1 capsule (200 mg) by mouth every 6 hours if needed (pain).      Junel FE 1/20, 28, 1 mg-20 mcg (21)/75 mg (7) tablet TAKE ONE TABLET BY MOUTH ONCE DAILY 28 tablet 2    losartan (Cozaar) 25 mg tablet Take 1 tablet (25 mg) by mouth once daily. 30 tablet 11    montelukast (Singulair) 10 mg tablet Take 1 tablet (10 mg) by mouth once daily at bedtime. 30 tablet 5    naproxen (Naprosyn) 500 mg tablet Take 1 tablet (500 mg) by mouth 2 times daily (morning and late afternoon).      norgestimate-ethinyl estradioL (Tri-Estarylla) 0.18/0.215/0.25 mg-35 mcg (28) tablet Take 1 tablet by mouth once daily.      phenylephrine-acetaminophen-GG 5-325-100 mg tablet Take 1 tablet by mouth if needed (congestion).       No current facility-administered medications on file prior to visit.       Labs: (past 26 weeks)  Recent Results (from the past 26 weeks)   Vaginitis Gram Stain For Bacterial Vaginosis + Yeast    Collection Time: 12/06/24 11:46 AM    Specimen: Vaginal; Swab   Result Value Ref Range    Marielena Score 0 0 - 3    Yeast ABSENT ABSENT    Clue Cells ABSENT ABSENT   Vitamin B12    Collection Time: 01/02/25 10:42 AM   Result Value Ref Range    Vitamin B12 405 211 - 911 pg/mL   TSH with reflex to Free T4 if abnormal     Collection Time: 01/02/25 10:42 AM   Result Value Ref Range    Thyroid Stimulating Hormone 2.29 0.44 - 3.98 mIU/L   Urine culture    Collection Time: 01/16/25  7:51 AM    Specimen: Clean Catch/Voided; Urine   Result Value Ref Range    Urine Culture       Clinically insignificant growth based on current clinical standards.   POCT Covid-19 Rapid Antigen    Collection Time: 03/14/25  8:33 AM   Result Value Ref Range    POC ASHLEY-COV-2 AG  Presumptive negative test for SARS-CoV-2 (no antigen detected)     Presumptive negative test for SARS-CoV-2 (no antigen detected)   POCT Influenza A/B manually resulted    Collection Time: 03/14/25  8:33 AM   Result Value Ref Range    POC Rapid Influenza A Negative Negative    POC Rapid Influenza B Negative Negative   CBC and Auto Differential    Collection Time: 04/09/25  8:10 AM   Result Value Ref Range    WHITE BLOOD CELL COUNT 6.6 3.8 - 10.8 Thousand/uL    RED BLOOD CELL COUNT 4.84 3.80 - 5.10 Million/uL    HEMOGLOBIN 15.5 11.7 - 15.5 g/dL    HEMATOCRIT 45.4 (H) 35.0 - 45.0 %    MCV 93.8 80.0 - 100.0 fL    MCH 32.0 27.0 - 33.0 pg    MCHC 34.1 32.0 - 36.0 g/dL    RDW 11.8 11.0 - 15.0 %    PLATELET COUNT 314 140 - 400 Thousand/uL    MPV 11.0 7.5 - 12.5 fL    ABSOLUTE NEUTROPHILS 3,201 1,500 - 7,800 cells/uL    ABSOLUTE LYMPHOCYTES 2,402 850 - 3,900 cells/uL    ABSOLUTE MONOCYTES 587 200 - 950 cells/uL    ABSOLUTE EOSINOPHILS 297 15 - 500 cells/uL    ABSOLUTE BASOPHILS 112 0 - 200 cells/uL    NEUTROPHILS 48.5 %    LYMPHOCYTES 36.4 %    MONOCYTES 8.9 %    EOSINOPHILS 4.5 %    BASOPHILS 1.7 %   Comprehensive Metabolic Panel    Collection Time: 04/09/25  8:10 AM   Result Value Ref Range    GLUCOSE 85 65 - 99 mg/dL    UREA NITROGEN (BUN) 10 7 - 25 mg/dL    CREATININE 0.79 0.50 - 0.96 mg/dL    EGFR 104 > OR = 60 mL/min/1.73m2    SODIUM 137 135 - 146 mmol/L    POTASSIUM 4.3 3.5 - 5.3 mmol/L    CHLORIDE 105 98 - 110 mmol/L    CARBON DIOXIDE 25 20 - 32 mmol/L    ELECTROLYTE BALANCE 7 7 - 17  mmol/L (calc)    CALCIUM 9.3 8.6 - 10.2 mg/dL    PROTEIN, TOTAL 7.4 6.1 - 8.1 g/dL    ALBUMIN 4.3 3.6 - 5.1 g/dL    BILIRUBIN, TOTAL 0.5 0.2 - 1.2 mg/dL    ALKALINE PHOSPHATASE 46 31 - 125 U/L    AST 14 10 - 30 U/L    ALT 8 6 - 29 U/L   Lipid Panel    Collection Time: 04/09/25  8:10 AM   Result Value Ref Range    CHOLESTEROL, TOTAL 181 <200 mg/dL    HDL CHOLESTEROL 48 (L) > OR = 50 mg/dL    TRIGLYCERIDES 95 <150 mg/dL    LDL-CHOLESTEROL 113 (H) mg/dL (calc)    CHOL/HDLC RATIO 3.8 <5.0 (calc)    NON HDL CHOLESTEROL 133 (H) <130 mg/dL (calc)   Hemoglobin A1C    Collection Time: 04/09/25  8:10 AM   Result Value Ref Range    HEMOGLOBIN A1c 5.0 <5.7 % of total Hgb    eAG (mg/dL) 97 mg/dL    eAG (mmol/L) 5.4 mmol/L   TSH with reflex to Free T4 if abnormal    Collection Time: 04/09/25  8:10 AM   Result Value Ref Range    TSH W/REFLEX TO FT4 2.35 mIU/L   Vitamin D 25-Hydroxy,Total (for eval of Vitamin D levels)    Collection Time: 04/09/25  8:10 AM   Result Value Ref Range    VITAMIN D,25-OH,TOTAL,IA 18 (L) 30 - 100 ng/mL       ECG  No results found for this or any previous visit (from the past 4464 hours).    Echocardiogram  No results found for this or any previous visit from the past 1095 days.      CV Studies:  EKG:No results found for this or any previous visit (from the past 4464 hours).  Echocardiogram: No results found for this or any previous visit from the past 1825 days.    Stress Testing IMGRESULT(YZZ1458:1:1825): No results found for this or any previous visit from the past 1825 days.    Cardiac Catheterization: No results found for this or any previous visit from the past 1825 days.  No results found for this or any previous visit from the past 3650 days.     Cardiac Scoring: No results found for this or any previous visit from the past 1825 days.    AAA : No results found for this or any previous visit from the past 1825 days.    OTHER: No results found for this or any previous visit from the past 1825  days.    LAST IMAGING RESULTS  MR brain w and wo IV contrast  Narrative: Interpreted By:  Rafa Mcmanus,   STUDY:  MR BRAIN W AND WO IV CONTRAST;  1/28/2025 6:52 pm      INDICATION:  Signs/Symptoms:memory difficulty, weakness, numbness.      COMPARISON:  None.      ACCESSION NUMBER(S):  [id number]      ORDERING CLINICIAN:  LACHELLE BEARD      TECHNIQUE:  Axial diffusion, axial T2, axial FLAIR, axial gradient echo T2, axial  T1, post gadolinium volumetric T1, as well as post gadolinium axial  T1 weighted MRI images of the brain were obtained. The patient  received  14 mL of  Dotarem gadolinium intravenously.      FINDINGS:  The diffusion weighted images fail to demonstrate abnormal diffusion  restriction to suggest acute infarction.      The ventricular system is nondilated.      No brain parenchymal signal abnormality is noted.      No abnormal intracranial mass lesion or abnormal intracranial  enhancement is identified on the postcontrast images.      There are secretions as well as mucosal thickening partially  opacifying the left maxillary sinus. Minimal mucosal thickening is  noted within the inferior right maxillary sinus and a few scattered  ethmoid air cells.      The mastoid air cells are clear.      Impression: No brain parenchymal signal abnormality or abnormal intracranial mass  lesion is noted.      There are secretions as well as mucosal thickening partially  opacifying the left maxillary sinus. Minimal mucosal thickening is  noted within the inferior right maxillary sinus and a few scattered  ethmoid air cells.      MACRO:  None.      Signed by: Rafa Mcmanus 1/29/2025 11:47 AM  Dictation workstation:   [id number]  MR cervical spine w and wo IV contrast  Narrative: Interpreted By:  Rafa Mcmanus,   STUDY:  MR CERVICAL SPINE W AND WO IV CONTRAST;  1/28/2025 6:54 pm      INDICATION:  Signs/Symptoms:weakness, numbness, brisk deep tendon reflexes.      COMPARISON:  None.      ACCESSION  NUMBER(S):  [id number]      ORDERING CLINICIAN:  LACHELLE BEARD      TECHNIQUE:      Sagittal STIR, sagittal T2, sagittal T1, axial T2, axial T1, as well  as post gadolinium sagittal axial T1 weighted MRI images through the  cervical spine were obtained. The patient received 14 mL of Dotarem  gadolinium intravenously.      FINDINGS:  There is a minimal retrolisthesis of C4 on C5.      There are minimal degenerative signal changes along endplates at the  C4/5, C5/6, and C6/7 levels.      The visualized spinal cord demonstrates no signal abnormality or  abnormal enhancement within it.      At the C2/3 level,  there is a minimal posterior disc bulge without  significant spinal canal narrowing. There are mild left-sided  degenerative facet changes without significant neural foraminal  narrowing.      At the C3/4 level,  there is a minimal posterior disc bulge without  significant spinal canal or neural foraminal narrowing.      At the C4/5 level,  there is a mild posterior disc/osteophyte complex  contributing to mild impression upon the ventral subarachnoid space  without significant spinal canal narrowing or spinal cord deformity.  There is very mild encroachment upon the left neural foramen there is  no significant right-sided neural foraminal narrowing      At the C5/6 level,  there is a mild posterior disc/osteophyte complex  contribute to mild impression upon the ventral subarachnoid space  without significant spinal canal narrowing or spinal cord deformity.  There is no significant neural foraminal narrowing.      At the C6/7 level,  there is a mild posterior disc/osteophyte complex  contribute to mild impression upon the ventral subarachnoid space  without significant spinal canal narrowing or spinal cord deformity.  There is no significant neural foraminal narrowing.      At the C7/T1 level,  there is no significant spinal canal stenosis or  neuroforaminal stenosis.      At the T1/2 level,  there is no  significant spinal canal stenosis or  neural foraminal stenosis.      Impression: There is a minimal retrolisthesis of C4 on C5.      There is mild multilevel cervical spondylosis as described above.      MACRO:  None.      Signed by: Rafa Mcmanus 1/29/2025 11:42 AM  Dictation workstation:   [id number]    Problem List Items Addressed This Visit       Palpitations - Primary    Shortness of breath    Dizziness    Dyslipidemia    Obesity with body mass index 30 or greater          Partha Meneses DO, FACC, FACOI

## 2025-05-28 ENCOUNTER — APPOINTMENT (OUTPATIENT)
Dept: CARDIOLOGY | Facility: CLINIC | Age: 30
End: 2025-05-28
Payer: COMMERCIAL

## 2025-05-28 VITALS
SYSTOLIC BLOOD PRESSURE: 126 MMHG | HEART RATE: 67 BPM | WEIGHT: 165 LBS | DIASTOLIC BLOOD PRESSURE: 78 MMHG | HEIGHT: 62 IN | BODY MASS INDEX: 30.36 KG/M2

## 2025-05-28 DIAGNOSIS — R42 LIGHTHEADEDNESS: ICD-10-CM

## 2025-05-28 DIAGNOSIS — E78.5 DYSLIPIDEMIA: ICD-10-CM

## 2025-05-28 DIAGNOSIS — R42 DIZZINESS: ICD-10-CM

## 2025-05-28 DIAGNOSIS — E66.9 OBESITY WITH BODY MASS INDEX 30 OR GREATER: ICD-10-CM

## 2025-05-28 DIAGNOSIS — R06.02 SHORTNESS OF BREATH: ICD-10-CM

## 2025-05-28 DIAGNOSIS — R00.2 PALPITATIONS: Primary | ICD-10-CM

## 2025-05-28 LAB
ATRIAL RATE: 67 BPM
P AXIS: 33 DEGREES
P OFFSET: 211 MS
P ONSET: 167 MS
PR INTERVAL: 114 MS
Q ONSET: 224 MS
QRS COUNT: 11 BEATS
QRS DURATION: 84 MS
QT INTERVAL: 380 MS
QTC CALCULATION(BAZETT): 401 MS
QTC FREDERICIA: 394 MS
R AXIS: 65 DEGREES
T AXIS: 1 DEGREES
T OFFSET: 414 MS
VENTRICULAR RATE: 67 BPM

## 2025-05-28 PROCEDURE — 93010 ELECTROCARDIOGRAM REPORT: CPT | Performed by: INTERNAL MEDICINE

## 2025-05-28 PROCEDURE — 99213 OFFICE O/P EST LOW 20 MIN: CPT | Performed by: INTERNAL MEDICINE

## 2025-05-28 PROCEDURE — 99213 OFFICE O/P EST LOW 20 MIN: CPT | Mod: 25 | Performed by: INTERNAL MEDICINE

## 2025-05-28 PROCEDURE — 3008F BODY MASS INDEX DOCD: CPT | Performed by: INTERNAL MEDICINE

## 2025-05-28 PROCEDURE — 1036F TOBACCO NON-USER: CPT | Performed by: INTERNAL MEDICINE

## 2025-05-28 PROCEDURE — 93005 ELECTROCARDIOGRAM TRACING: CPT | Performed by: INTERNAL MEDICINE

## 2025-06-20 ENCOUNTER — APPOINTMENT (OUTPATIENT)
Dept: OBSTETRICS AND GYNECOLOGY | Facility: CLINIC | Age: 30
End: 2025-06-20

## 2025-06-20 VITALS
BODY MASS INDEX: 31.47 KG/M2 | HEIGHT: 62 IN | SYSTOLIC BLOOD PRESSURE: 110 MMHG | DIASTOLIC BLOOD PRESSURE: 80 MMHG | WEIGHT: 171 LBS

## 2025-06-20 DIAGNOSIS — Z01.419 ENCOUNTER FOR WELL WOMAN EXAM WITH ROUTINE GYNECOLOGICAL EXAM: Primary | ICD-10-CM

## 2025-06-20 DIAGNOSIS — Z11.51 SCREENING FOR HPV (HUMAN PAPILLOMAVIRUS): ICD-10-CM

## 2025-06-20 DIAGNOSIS — Z12.4 SCREENING FOR CERVICAL CANCER: ICD-10-CM

## 2025-06-20 DIAGNOSIS — Z86.19 HISTORY OF PCR DNA POSITIVE FOR HSV2: ICD-10-CM

## 2025-06-20 PROCEDURE — 99395 PREV VISIT EST AGE 18-39: CPT | Performed by: NURSE PRACTITIONER

## 2025-06-20 PROCEDURE — 3008F BODY MASS INDEX DOCD: CPT | Performed by: NURSE PRACTITIONER

## 2025-06-20 PROCEDURE — 1036F TOBACCO NON-USER: CPT | Performed by: NURSE PRACTITIONER

## 2025-06-20 RX ORDER — VALACYCLOVIR HYDROCHLORIDE 500 MG/1
500 TABLET, FILM COATED ORAL DAILY
Qty: 30 TABLET | Refills: 11 | Status: SHIPPED | OUTPATIENT
Start: 2025-06-20 | End: 2025-12-17

## 2025-06-20 NOTE — PROGRESS NOTES
"     HPI:   Kiera Mata is a 29 y.o. who presents today for her annual gynecologic exam with complaints    She has the following concerns; recent primary HSV outbreak. Desires to start suppressive dose. Due for PAP. Periods are regular. She is considering a pregnancy; but has had some changes in her health recently. Had a positive NICHOLAS test, is planning some additional upcoming testing. She feels her depression and anxiety symptoms are well managed. Follows with psychiatry.     GYN HISTORY:  Periods are regular every 28-30 days, lasting 4 days.   Dysmenorrhea:none. Cyclic symptoms include none.   No intermenstrual bleeding, spotting, or discharge.    Current contraception: none   Requests STD testing: no     PAP History   Last pap:   2022 Normal HPV Not done  History of abnormal pap: no    Health Screening  Family history of breast, uterine, ovarian or colon cancer: no         The patient feels safe at home.         Review of Systems:   Constitutional: no fever and no chills.  Cardiovascular: no chest pain.   Respiratory: no shortness of breath.   Gastrointestinal: no nausea, no abdominal pain and no constipation  Genitourinary: no dysuria, no urinary incontinence, no vaginal dryness, no pelvic pain and no vaginal discharge.   Neurological: no headache.  Psychiatric: no anxiety and no depression.              Objective         /80   Ht 1.58 m (5' 2.21\")   Wt 77.6 kg (171 lb)   LMP 06/02/2025   BMI 31.07 kg/m²         Physical Exam:   Constitutional: Alert and in no acute distress. Well developed, well nourished.      Neck: No neck asymmetry. Supple. Thyroid not enlarged and there were no palpable thyroid nodules.      Cardiovascular: Heart rate and rhythm were normal, normal S1 and S2, no gallops, and no murmurs.      Pulmonary: No respiratory distress. Clear bilateral breath sounds.      Chest: Breasts: Normal appearance, no nipple discharge and no skin changes. Palpation of breasts and axillae: No " palpable mass and no axillary lymphadenopathy.      Abdomen: Soft nontender; no abdominal mass palpated. Normal bowel sounds. No organomegaly.      Genitourinary:   - External genitalia: Normal.   - Palpation of lymph nodes in groin: No inguinal lymphadenopathy.   - Bartholin's Urethral and Skenes Glands: Normal.   - Urethra: Normal.    -Bladder: Normal on palpation.   - Vagina: Normal.   - Cervix: Normal.   - Uterus: Normal. Right Adnexa/parametria: Normal. Left Adnexa/parametria: Normal.   - Perianal Area: Normal.      Skin: Normal skin color and pigmentation, normal skin turgor, and no rash     Psychiatric: Alert and oriented x 3. Affect normal to patient baseline. Mood: Appropriate.          Assessment/Plan   Diagnoses and all orders for this visit:  Encounter for well woman exam with routine gynecological exam  Here for well woman exam. She is doing well from a GYN standpoint- periods are well managed. She has no signs of an outbreak or vaginal discharge today. PAP obtained. She is considering a pregnancy in the next year. She will continue to track/ monitor her periods moving forward.   Screening for cervical cancer  -     THINPREP PAP TEST (25-30)  Screening for HPV (human papillomavirus)  -     THINPREP PAP TEST (25-30)  History of PCR DNA positive for HSV2  -     valACYclovir (Valtrex) 500 mg tablet; Take 1 tablet (500 mg) by mouth once daily.  Follow-up annually; sooner if needed, or with a positive pregnancy test.        THOMAS Flood 06/20/25 10:21 AM

## 2025-07-15 LAB
CYTOLOGY CMNT CVX/VAG CYTO-IMP: NORMAL
LAB AP HPV GENOTYPE QUESTION: NO
LAB AP HPV HR: NORMAL
LABORATORY COMMENT REPORT: NORMAL
PATH REPORT.TOTAL CANCER: NORMAL